# Patient Record
Sex: FEMALE | Race: OTHER | Employment: FULL TIME | URBAN - METROPOLITAN AREA
[De-identification: names, ages, dates, MRNs, and addresses within clinical notes are randomized per-mention and may not be internally consistent; named-entity substitution may affect disease eponyms.]

---

## 2018-06-05 ENCOUNTER — OFFICE VISIT (OUTPATIENT)
Dept: URGENT CARE | Age: 33
End: 2018-06-05

## 2018-06-05 VITALS
BODY MASS INDEX: 28.19 KG/M2 | OXYGEN SATURATION: 99 % | WEIGHT: 186 LBS | HEIGHT: 68 IN | DIASTOLIC BLOOD PRESSURE: 76 MMHG | TEMPERATURE: 98 F | RESPIRATION RATE: 16 BRPM | HEART RATE: 60 BPM | SYSTOLIC BLOOD PRESSURE: 134 MMHG

## 2018-06-05 DIAGNOSIS — H10.9 CONJUNCTIVITIS, UNSPECIFIED CONJUNCTIVITIS TYPE, UNSPECIFIED LATERALITY: Primary | ICD-10-CM

## 2018-06-05 RX ORDER — CIPROFLOXACIN HYDROCHLORIDE 3.5 MG/ML
2 SOLUTION/ DROPS TOPICAL 4 TIMES DAILY
Qty: 5 ML | Refills: 0 | Status: SHIPPED | OUTPATIENT
Start: 2018-06-05 | End: 2018-06-13 | Stop reason: SDUPTHER

## 2018-06-05 RX ORDER — NORETHINDRONE ACETATE AND ETHINYL ESTRADIOL 1; .02 MG/1; MG/1
TABLET ORAL
COMMUNITY
End: 2019-04-25

## 2018-06-05 RX ORDER — POLYMYXIN B SULFATE AND TRIMETHOPRIM 1; 10000 MG/ML; [USP'U]/ML
1 SOLUTION OPHTHALMIC EVERY 6 HOURS
Qty: 10 ML | Refills: 0 | Status: SHIPPED | OUTPATIENT
Start: 2018-06-05 | End: 2018-06-05

## 2018-06-05 NOTE — PATIENT INSTRUCTIONS
Pinkeye: Care Instructions  Your Care Instructions    Pinkeye is redness and swelling of the eye surface and the conjunctiva (the lining of the eyelid and the covering of the white part of the eye). Pinkeye is also called conjunctivitis. Pinkeye is often caused by infection with bacteria or a virus. Dry air, allergies, smoke, and chemicals are other common causes. Pinkeye often clears on its own in 7 to 10 days. Antibiotics only help if the pinkeye is caused by bacteria. Pinkeye caused by infection spreads easily. If an allergy or chemical is causing pinkeye, it will not go away unless you can avoid whatever is causing it. Follow-up care is a key part of your treatment and safety. Be sure to make and go to all appointments, and call your doctor if you are having problems. It's also a good idea to know your test results and keep a list of the medicines you take. How can you care for yourself at home? · Wash your hands often. Always wash them before and after you treat pinkeye or touch your eyes or face. · Use moist cotton or a clean, wet cloth to remove crust. Wipe from the inside corner of the eye to the outside. Use a clean part of the cloth for each wipe. · Put cold or warm wet cloths on your eye a few times a day if the eye hurts. · Do not wear contact lenses or eye makeup until the pinkeye is gone. Throw away any eye makeup you were using when you got pinkeye. Clean your contacts and storage case. If you wear disposable contacts, use a new pair when your eye has cleared and it is safe to wear contacts again. · If the doctor gave you antibiotic ointment or eyedrops, use them as directed. Use the medicine for as long as instructed, even if your eye starts looking better soon. Keep the bottle tip clean, and do not let it touch the eye area. · To put in eyedrops or ointment:  ¨ Tilt your head back, and pull your lower eyelid down with one finger.   ¨ Drop or squirt the medicine inside the lower lid.  ¨ Close your eye for 30 to 60 seconds to let the drops or ointment move around. ¨ Do not touch the ointment or dropper tip to your eyelashes or any other surface. · Do not share towels, pillows, or washcloths while you have pinkeye. When should you call for help? Call your doctor now or seek immediate medical care if:  ? · You have pain in your eye, not just irritation on the surface. ? · You have a change in vision or loss of vision. ? · You have an increase in discharge from the eye.   ? · Your eye has not started to improve or begins to get worse within 48 hours after you start using antibiotics. ? · Pinkeye lasts longer than 7 days. ? Watch closely for changes in your health, and be sure to contact your doctor if you have any problems. Where can you learn more? Go to http://supriya-barrington.info/. Enter Y392 in the search box to learn more about \"Pinkeye: Care Instructions. \"  Current as of: March 20, 2017  Content Version: 11.4  © 4262-4402 Healthwise, Incorporated. Care instructions adapted under license by [employer] (which disclaims liability or warranty for this information). If you have questions about a medical condition or this instruction, always ask your healthcare professional. [address] any warranty or liability for your use of this information.

## 2018-06-05 NOTE — MR AVS SNAPSHOT
[address] [facility] [address] 
[phone] Patient: Michale Mata MRN: [id number] Visit Information Date & Time Provider Department Dept. Phone Encounter #  
 6/5/2018 12:45 PM [address] [facility] [phone] [id number] Upcoming Health Maintenance Date Due DTaP/Tdap/Td series (1 - Tdap) 8/16/2007 PAP AKA CERVICAL CYTOLOGY 8/16/2007 Influenza Age 5 to Adult 8/1/2018 Allergies as of 6/5/2018  Review Complete On: 6/5/2018 By: Jose Sommers No Known Allergies Current Immunizations  Never Reviewed No immunizations on file. Not reviewed this visit You Were Diagnosed With   
  
 Codes Comments Conjunctivitis, unspecified conjunctivitis type, unspecified laterality    -  Primary ICD-10-CM: H10.9 ICD-9-CM: 372.30 Vitals BP Pulse Temp Resp Height(growth percentile) Weight(growth percentile) 134/76 60 98 °F (36.7 °C) 16 5' 8\" (1.727 m) 186 lb (84.4 kg) SpO2 BMI OB Status Smoking Status 99% 28.28 kg/m2 Having regular periods Never Smoker Vitals History BMI and BSA Data Body Mass Index Body Surface Area  
 28.28 kg/m 2 2.01 m 2 Preferred Pharmacy Pharmacy Name Phone [facility] [address] [phone] Your Updated Medication List  
  
   
This list is accurate as of 6/5/18 12:51 PM.  Always use your most recent med list.  
  
  
  
  
 Shanthi Cisse 1/20 (21) 1-20 mg-mcg Tab Generic drug:  norethindrone-ethinyl estradiol Take  by mouth. trimethoprim-polymyxin b ophthalmic solution Commonly known as:  POLYTRIM Administer 1 Drop to left eye every six (6) hours for 7 days. Prescriptions Sent to Pharmacy Refills  
 trimethoprim-polymyxin b (POLYTRIM) ophthalmic solution 0 Sig: Administer 1 Drop to left eye every six (6) hours for 7 days. Class: Normal  
 Pharmacy: [address] [facility], [address] [place] [address].  #: [phone] Route: Left Eye Patient Instructions Pinkeye: Care Instructions Your Care Instructions Pinkeye is redness and swelling of the eye surface and the conjunctiva (the lining of the eyelid and the covering of the white part of the eye). Pinkeye is also called conjunctivitis. Pinkeye is often caused by infection with bacteria or a virus. Dry air, allergies, smoke, and chemicals are other common causes. Pinkeye often clears on its own in 7 to 10 days. Antibiotics only help if the pinkeye is caused by bacteria. Pinkeye caused by infection spreads easily. If an allergy or chemical is causing pinkeye, it will not go away unless you can avoid whatever is causing it. Follow-up care is a key part of your treatment and safety. Be sure to make and go to all appointments, and call your doctor if you are having problems. It's also a good idea to know your test results and keep a list of the medicines you take. How can you care for yourself at home? · Wash your hands often. Always wash them before and after you treat pinkeye or touch your eyes or face. · Use moist cotton or a clean, wet cloth to remove crust. Wipe from the inside corner of the eye to the outside. Use a clean part of the cloth for each wipe. · Put cold or warm wet cloths on your eye a few times a day if the eye hurts. · Do not wear contact lenses or eye makeup until the pinkeye is gone. Throw away any eye makeup you were using when you got pinkeye. Clean your contacts and storage case. If you wear disposable contacts, use a new pair when your eye has cleared and it is safe to wear contacts again. · If the doctor gave you antibiotic ointment or eyedrops, use them as directed. Use the medicine for as long as instructed, even if your eye starts looking better soon. Keep the bottle tip clean, and do not let it touch the eye area. · To put in eyedrops or ointment: ¨ Tilt your head back, and pull your lower eyelid down with one finger. ¨ Drop or squirt the medicine inside the lower lid. ¨ Close your eye for 30 to 60 seconds to let the drops or ointment move around. ¨ Do not touch the ointment or dropper tip to your eyelashes or any other surface. · Do not share towels, pillows, or washcloths while you have pinkeye. When should you call for help? Call your doctor now or seek immediate medical care if: 
? · You have pain in your eye, not just irritation on the surface. ? · You have a change in vision or loss of vision. ? · You have an increase in discharge from the eye.  
? · Your eye has not started to improve or begins to get worse within 48 hours after you start using antibiotics. ? · Pinkeye lasts longer than 7 days. ? Watch closely for changes in your health, and be sure to contact your doctor if you have any problems. Where can you learn more? Go to http://uspriya-barrington.info/. Enter Y392 in the search box to learn more about \"Pinkeye: Care Instructions. \" Current as of: March 20, 2017 Content Version: 11.4 © 8102-5507 Healthwise, Incorporated. Care instructions adapted under license by [employer] (which disclaims liability or warranty for this information). If you have questions about a medical condition or this instruction, always ask your healthcare professional. [place] [address] any warranty or liability for your use of this information. Introducing [facility]! [employer] introduces [employer] patient portal. Now you can access parts of your medical record, email your doctor's office, and request medication refills online. 1. In your internet browser, go to [email or web address] 2. Click on the First Time User? Click Here link in the Sign In box. You will see the New Member Sign Up page. 3. Enter your [employer] Access Code exactly as it appears below. You will not need to use this code after youve completed the sign-up process. If you do not sign up before the expiration date, you must request a new code. · [employer] Access Code: [email or web address] Expires: 9/3/2018 12:29 PM 
 
4. Enter the last four digits of your Social Security Number (xxxx) and Date of Birth (mm/dd/yyyy) as indicated and click Submit. You will be taken to the next sign-up page. 5. Create a [employer] ID. This will be your [employer] login ID and cannot be changed, so think of one that is secure and easy to remember. 6. Create a [employer] password. You can change your password at any time. 7. Enter your Password Reset Question and Answer. This can be used at a later time if you forget your password. 8. Enter your e-mail address. You will receive e-mail notification when new information is available in [address]. 9. Click Sign Up. You can now view and download portions of your medical record. 10. Click the Download Summary menu link to download a portable copy of your medical information. If you have questions, please visit the Frequently Asked Questions section of the [employer] website. Remember, [employer] is NOT to be used for urgent needs. For medical emergencies, dial 911. Now available from your iPhone and Android! Please provide this summary of care documentation to your next provider. Your primary care clinician is listed as Sven CHAVEZ. If you have any questions after today's visit, please call [phone].

## 2018-06-05 NOTE — PROGRESS NOTES
HPI Comments: 33 y.o. female with burning, redness, discharge and mattering in left eye for 2 days. No other symptoms. No significant prior ophthalmological history. No change in visual acuity, no photophobia, no severe eye pain. Son has pink eye on polytrim drops, which she has used with improvement. The history is provided by the patient. History reviewed. No pertinent past medical history. Past Surgical History:   Procedure Laterality Date    HX  SECTION  2017         Family History   Problem Relation Age of Onset    Hypertension Father     Asthma Father         Social History     Social History    Marital status:      Spouse name: N/A    Number of children: N/A    Years of education: N/A     Occupational History    Not on file. Social History Main Topics    Smoking status: Never Smoker    Smokeless tobacco: Never Used    Alcohol use No    Drug use: Not on file    Sexual activity: Not on file     Other Topics Concern    Not on file     Social History Narrative                ALLERGIES: Review of patient's allergies indicates no known allergies. Review of Systems   Eyes: Positive for discharge, redness and itching. Negative for photophobia, pain and visual disturbance. Vitals:    18 1233   BP: 134/76   Pulse: 60   Resp: 16   Temp: 98 °F (36.7 °C)   SpO2: 99%   Weight: 186 lb (84.4 kg)   Height: 5' 8\" (1.727 m)       Physical Exam   Constitutional: She appears well-developed and well-nourished. No distress. Eyes: EOM are normal. Pupils are equal, round, and reactive to light. Right eye exhibits no discharge. Left eye exhibits no discharge. Right conjunctiva is not injected. Left conjunctiva is injected. Neurological: She is alert. Skin: She is not diaphoretic. Psychiatric: She has a normal mood and affect. Her behavior is normal. Judgment and thought content normal.   Nursing note and vitals reviewed.       MDM    Procedures ICD-10-CM ICD-9-CM    1. Conjunctivitis, unspecified conjunctivitis type, unspecified laterality H10.9 372.30      Medications Ordered Today   Medications    DISCONTD: trimethoprim-polymyxin b (POLYTRIM) ophthalmic solution     Sig: Administer 1 Drop to left eye every six (6) hours for 7 days. Dispense:  10 mL     Refill:  0    ciprofloxacin HCl (CILOXAN) 0.3 % ophthalmic solution     Sig: Administer 2 Drops to left eye four (4) times daily for 7 days. Dispense:  5 mL     Refill:  0     The patients condition was discussed with the patient and they understand. The patient is to follow up with PCP [facility]. If signs and symptoms become worse the pt is to go to the ER. The patient is to take medications as prescribed.

## 2018-06-13 RX ORDER — CIPROFLOXACIN HYDROCHLORIDE 3.5 MG/ML
2 SOLUTION/ DROPS TOPICAL 4 TIMES DAILY
Qty: 5 ML | Refills: 0 | Status: SHIPPED | OUTPATIENT
Start: 2018-06-13 | End: 2018-06-20

## 2018-06-13 NOTE — PROGRESS NOTES
Patient called. Spoke with nurse. States ran out of cipro eye drops and right eye now infected. Escribed Cipro drops to pharmacy.

## 2019-04-25 ENCOUNTER — OFFICE VISIT (OUTPATIENT)
Dept: URGENT CARE | Age: 34
End: 2019-04-25

## 2019-04-25 VITALS
DIASTOLIC BLOOD PRESSURE: 94 MMHG | WEIGHT: 185 LBS | OXYGEN SATURATION: 98 % | SYSTOLIC BLOOD PRESSURE: 145 MMHG | TEMPERATURE: 97.2 F | BODY MASS INDEX: 28.04 KG/M2 | HEIGHT: 68 IN | HEART RATE: 77 BPM | RESPIRATION RATE: 18 BRPM

## 2019-04-25 DIAGNOSIS — J02.9 SORE THROAT: Primary | ICD-10-CM

## 2019-04-25 DIAGNOSIS — J30.9 ALLERGIC RHINITIS, UNSPECIFIED SEASONALITY, UNSPECIFIED TRIGGER: ICD-10-CM

## 2019-04-25 LAB
S PYO AG THROAT QL: NEGATIVE
VALID INTERNAL CONTROL?: YES

## 2019-04-25 RX ORDER — LEVOCETIRIZINE DIHYDROCHLORIDE 5 MG/1
5 TABLET, FILM COATED ORAL DAILY
Qty: 30 TAB | Refills: 1 | Status: SHIPPED | OUTPATIENT
Start: 2019-04-25 | End: 2020-04-02 | Stop reason: ALTCHOICE

## 2019-04-25 NOTE — LETTER
NOTIFICATION RETURN TO WORK / SCHOOL 
 
4/25/2019 11:48 AM 
 
Ms. Frank Cuadra [address] [address] [address] To Whom It May Concern: 
 
Frank Cuadra is currently under the care of [address] [place] [address]. She will return to work/school on: 04/26 OR 04/27/2019 If there are questions or concerns please have the patient contact our office. Sincerely, E PROVIDER

## 2019-04-25 NOTE — PROGRESS NOTES
Here concerned about strep  2-3 days of sore throat  Worse with swallowing but tolerating food and fluids without issue  Pain 3/10. No fevers or chills  Associated nasal congestion and sneezing  +possible exposure to strep. 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 Overall not improved with home therapies. No past medical history on file.      Past Surgical History:   Procedure Laterality Date    HX  SECTION  2017         Family History   Problem Relation Age of Onset    Hypertension Father     Asthma Father         Social History     Socioeconomic History    Marital status:      Spouse name: Not on file    Number of children: Not on file    Years of education: Not on file    Highest education level: Not on file   Occupational History    Not on file   Social Needs    Financial resource strain: Not on file    Food insecurity:     Worry: Not on file     Inability: Not on file    Transportation needs:     Medical: Not on file     Non-medical: Not on file   Tobacco Use    Smoking status: Never Smoker    Smokeless tobacco: Never Used   Substance and Sexual Activity    Alcohol use: No    Drug use: Not on file    Sexual activity: Not on file   Lifestyle    Physical activity:     Days per week: Not on file     Minutes per session: Not on file    Stress: Not on file   Relationships    Social connections:     Talks on phone: Not on file     Gets together: Not on file     Attends [employer] service: Not on file     Active member of club or organization: Not on file     Attends meetings of clubs or organizations: Not on file     Relationship status: Not on file    Intimate partner violence:     Fear of current or ex partner: Not on file     Emotionally abused: Not on file     Physically abused: Not on file     Forced sexual activity: Not on file   Other Topics Concern    Not on file   Social History Narrative    Not on file                ALLERGIES: Patient has no known allergies. Review of Systems   Skin: Negative for rash. All other systems reviewed and are negative. Vitals:    04/25/19 1101   BP: (!) 145/94   Pulse: 77   Resp: 18   Temp: 97.2 °F (36.2 °C)   SpO2: 98%   Weight: 185 lb (83.9 kg)   Height: 5' 8\" (1.727 m)       Physical Exam   Constitutional: She is oriented to person, place, and time. No distress. Non-toxic appearing, well hydrated   HENT:   Pale swollen nasal turbinates bilat. TMs normal  OP erythema without swelling, exudate or lesion. Mucus membranes moist. Uvula midline   Eyes: Pupils are equal, round, and reactive to light. EOM are normal. No scleral icterus. Neck: Normal range of motion. Neck supple. Cardiovascular: Normal rate, regular rhythm and normal heart sounds. Exam reveals no gallop and no friction rub. No murmur heard. Pulmonary/Chest: Effort normal and breath sounds normal. No respiratory distress. She has no wheezes. She has no rales. Lymphadenopathy:     She has no cervical adenopathy. Neurological: She is alert and oriented to person, place, and time. Skin: Skin is warm and dry. No rash noted. She is not diaphoretic. No erythema. No pallor. Psychiatric: She has a normal mood and affect. Her behavior is normal. Thought content normal.   Nursing note and vitals reviewed. MDM     Differential Diagnosis; Clinical Impression; Plan:       CLINICAL IMPRESSION:  (J02.9) Sore throat  (primary encounter diagnosis)  (J30.9) Allergic rhinitis, unspecified seasonality, unspecified trigger    Orders Placed This Encounter      levocetirizine (XYZAL) 5 mg tablet          Sig: Take 1 Tab by mouth daily. Dispense:  30 Tab          Refill:  1      Plan:  Negative rapid strep. No indication for culture based on clinical presentation.  Suspect allergies/post nasal drip at this time  Start xyzal  Gargles, lozenges, tylenol for throat discomfort  Review hadnouts    We have reviewed concerning signs/symptoms, normal vs abnormal progression of medical condition and when to seek immediate medical attention. Schedule with PCP or Urgent Care immediately for worsening or new symptoms. See your PCP if there is not at least some improvement in symptoms within the next 5-7 days. You should see your PCP for updates on your routine health maintenance.              Procedures

## 2019-04-25 NOTE — PATIENT INSTRUCTIONS
Follow up with PCP without improvement over next 5-7 days sooner/immediately for new or worsening  Strep test was negative today     Sore Throat: Care Instructions  Your Care Instructions    Infection by bacteria or a virus causes most sore throats. Cigarette smoke, dry air, air pollution, allergies, and yelling can also cause a sore throat. Sore throats can be painful and annoying. Fortunately, most sore throats go away on their own. If you have a bacterial infection, your doctor may prescribe antibiotics. Follow-up care is a key part of your treatment and safety. Be sure to make and go to all appointments, and call your doctor if you are having problems. It's also a good idea to know your test results and keep a list of the medicines you take. How can you care for yourself at home? · If your doctor prescribed antibiotics, take them as directed. Do not stop taking them just because you feel better. You need to take the full course of antibiotics. · Gargle with warm salt water once an hour to help reduce swelling and relieve discomfort. Use 1 teaspoon of salt mixed in 1 cup of warm water. · Take an over-the-counter pain medicine, such as acetaminophen (Tylenol), ibuprofen (Advil, Motrin), or naproxen (Aleve). Read and follow all instructions on the label. · Be careful when taking over-the-counter cold or flu medicines and Tylenol at the same time. Many of these medicines have acetaminophen, which is Tylenol. Read the labels to make sure that you are not taking more than the recommended dose. Too much acetaminophen (Tylenol) can be harmful. · Drink plenty of fluids. Fluids may help soothe an irritated throat. Hot fluids, such as tea or soup, may help decrease throat pain. · Use over-the-counter throat lozenges to soothe pain. Regular cough drops or hard candy may also help. These should not be given to young children because of the risk of choking. · Do not smoke or allow others to smoke around you.  If you need help quitting, talk to your doctor about stop-smoking programs and medicines. These can increase your chances of quitting for good. · Use a vaporizer or humidifier to add moisture to your bedroom. Follow the directions for cleaning the machine. When should you call for help? Call your doctor now or seek immediate medical care if:    · You have new or worse trouble swallowing.     · Your sore throat gets much worse on one side.    Watch closely for changes in your health, and be sure to contact your doctor if you do not get better as expected. Where can you learn more? Go to http://supriya-barrignton.info/. Enter [phone] [id number] [phone] in the search box to learn more about \"Sore Throat: Care Instructions. \"  Current as of: March 27, 2018  Content Version: 11.9  © 1448-7297 [employer]. Care instructions adapted under license by [employer] (which disclaims liability or warranty for this information). If you have questions about a medical condition or this instruction, always ask your healthcare professional. [address] any warranty or liability for your use of this information.

## 2019-05-02 ENCOUNTER — OFFICE VISIT (OUTPATIENT)
Dept: PRIMARY CARE CLINIC | Age: 34
End: 2019-05-02

## 2019-05-02 VITALS
WEIGHT: 185.8 LBS | SYSTOLIC BLOOD PRESSURE: 126 MMHG | BODY MASS INDEX: 28.16 KG/M2 | TEMPERATURE: 98.1 F | DIASTOLIC BLOOD PRESSURE: 75 MMHG | OXYGEN SATURATION: 98 % | HEART RATE: 51 BPM | RESPIRATION RATE: 18 BRPM | HEIGHT: 68 IN

## 2019-05-02 DIAGNOSIS — R05.9 COUGH: ICD-10-CM

## 2019-05-02 DIAGNOSIS — J30.1 SEASONAL ALLERGIC RHINITIS DUE TO POLLEN: Primary | ICD-10-CM

## 2019-05-02 RX ORDER — MONTELUKAST SODIUM 10 MG/1
10 TABLET ORAL DAILY
Qty: 30 TAB | Refills: 0 | Status: SHIPPED | OUTPATIENT
Start: 2019-05-02 | End: 2020-04-02 | Stop reason: ALTCHOICE

## 2019-05-02 NOTE — PROGRESS NOTES
Subjective:  
Marce Zee is a 29 y.o. female who complains of sore throat (worse at night and after coughing), congestion, dry cough and bilateral ear fullness for 9 days, stable since that time. Seen at [facility]  for sore throat and allergies. Neg RST. Still not feeling any better. Coughing throughout the day, feels like throat is swollen at night due to cough. Denies any fevers, nasal discharge or ear pain. Has headaches due to cough. Taking advil cold/sinus and robitussin. She denies a history of shortness of breath and wheezing. Patient does not smoke cigarettes. Relevant PMH: History reviewed. No pertinent past medical history. Past Surgical History:  
Procedure Laterality Date  HX  SECTION  2017 No Known Allergies Review of Systems Pertinent items are noted in HPI. Objective:  
 
Visit Vitals /75 (BP 1 Location: Left arm, BP Patient Position: Sitting) Pulse (!) 51 Temp 98.1 °F (36.7 °C) (Oral) Resp 18 Ht 5' 8\" (1.727 m) Wt 185 lb 12.8 oz (84.3 kg) SpO2 98% BMI 28.25 kg/m² General:  alert, cooperative, no distress Eyes: negative Ears: TM's with mild fluid bilaterally, no erythema Sinuses: Normal paranasal sinuses without tenderness Mouth:  Lips, mucosa, and tongue normal. Teeth and gums normal and normal findings: oropharynx pink & moist without lesions or evidence of thrush Neck: supple, symmetrical, trachea midline and no adenopathy. Heart: S1 and S2 normal, no murmurs noted. Lungs: clear to auscultation bilaterally Assessment/Plan: ICD-10-CM ICD-9-CM 1. Seasonal allergic rhinitis due to pollen J30.1 477.0 2. Cough R05 786.2 Orders Placed This Encounter  montelukast (SINGULAIR) 10 mg tablet Start daily antihistamine. Suggested symptomatic OTC remedies. RTC prn. Cherrie Vanessa, WALDEMAR This note will not be viewable in [address]

## 2019-05-02 NOTE — PROGRESS NOTES
Chief Complaint Patient presents with  Cold Symptoms  
pt c/o continued sore throat and sinus pressure since last weekend, pt states she has taken otc tylenol cold and sinus This note will not be viewable in [address]

## 2019-05-02 NOTE — PATIENT INSTRUCTIONS
Seasonal Allergies: Care Instructions Your Care Instructions Allergies occur when your body's defense system (immune system) overreacts to certain substances. The immune system treats a harmless substance as if it were a harmful germ or virus. Many things can cause this to happen. Examples include pollens, medicine, food, dust, animal dander, and mold. Your allergies are seasonal if you have symptoms just at certain times of the year. In that case, you are probably allergic to pollens from certain trees, grasses, or weeds. Allergies can be mild or severe. Over-the-counter allergy medicine may help with some symptoms. Read and follow all instructions on the label. Managing your allergies is an important part of staying healthy. Your doctor may suggest that you have tests to help find the cause of your allergies. When you know what things trigger your symptoms, you can avoid them. This can prevent allergy symptoms and other health problems. In some cases, immunotherapy might help. For this treatment, you get shots or use pills that have a small amount of certain allergens in them. Your body \"gets used to\" the allergen, so you react less to it over time. This kind of treatment may help prevent or reduce some allergy symptoms. Follow-up care is a key part of your treatment and safety. Be sure to make and go to all appointments, and call your doctor if you are having problems. It's also a good idea to know your test results and keep a list of the medicines you take. How can you care for yourself at home? · Be safe with medicines. Take your medicines exactly as prescribed. Call your doctor if you think you are having a problem with your medicine. · During your allergy season, keep windows closed. If you need to use air-conditioning, change or clean all filters every month. Take a shower and change your clothes after you have been outside. · Stay inside when pollen counts are high. Vacuum once or twice a week. Use a vacuum  with a HEPA filter or a double-thickness filter. When should you call for help? Give an epinephrine shot if: 
  · You think you are having a severe allergic reaction.  
 After giving an epinephrine shot, call 911, even if you feel better. 
 Call 911 if: 
  · You have symptoms of a severe allergic reaction. These may include: 
? Sudden raised, red areas (hives) all over your body. ? Swelling of the throat, mouth, lips, or tongue. ? Trouble breathing. ? Passing out (losing consciousness). Or you may feel very lightheaded or suddenly feel weak, confused, or restless.  
  · You have been given an epinephrine shot, even if you feel better.  
 Call your doctor now or seek immediate medical care if: 
  · You have symptoms of an allergic reaction, such as: ? A rash or hives (raised, red areas on the skin). ? Itching. ? Swelling. ? Belly pain, nausea, or vomiting.  
 Watch closely for changes in your health, and be sure to contact your doctor if: 
  · You do not get better as expected. Where can you learn more? Go to http://supriya-barrington.info/. Enter J912 in the search box to learn more about \"Seasonal Allergies: Care Instructions. \" Current as of: June 27, 2018 Content Version: 11.9 © 5392-2930 [employer]. Care instructions adapted under license by [employer] (which disclaims liability or warranty for this information). If you have questions about a medical condition or this instruction, always ask your healthcare professional. [place] [address] any warranty or liability for your use of this information.

## 2020-02-21 ENCOUNTER — TELEPHONE (OUTPATIENT)
Dept: INTERNAL MEDICINE CLINIC | Age: 35
End: 2020-02-21

## 2020-02-21 NOTE — TELEPHONE ENCOUNTER
Patient last seen 2/12/2015 is requesting a call back to get a sooner than available appt to [facility] Complete Physical appt. Please call to discuss.  Thank you

## 2020-02-24 NOTE — TELEPHONE ENCOUNTER
Called, spoke to pt. Two identifiers confirmed. Appointment scheduled for 4/2 @ 130 with Dr. Jason Lomeli. Pt verbalized understanding of information discussed w/ no further questions at this time.

## 2020-04-01 NOTE — PROGRESS NOTES
HISTORY OF PRESENT ILLNESS  Mckayla Barnard is a 36 y.o. female. HPI     Pursuant to the emergency declaration under the [address] and [employer], 1135 waiver authority and the [employer] and [employer] Act, this Virtual Visit was conducted, with patient's consent, to reduce the patient's risk of exposure to COVID-19 and provide continuity of care for an established patient. Services were provided through a video synchronous discussion virtually to substitute for in-person appointment. Pt here to re-establish care and CPE  Last OV 2015    Hx migraine headaches-not active    Gyn MD--had OV at [facility] this year for PAP and well woman exam  BP wnl at gyn appt  Has 2 1/3 yo son now--lives in [place] with  but will be staying in [facility] for the next few months  Gained 65 lbs with last pregnancy but has been able to lose 55 lbs with low carb diet and daily exercise    Only c/o dry patch on upper lip after she drinks red wine , similar to patch of eczema, lasts 2 days  No hives or angioedema      NS rare etoh     1 son    Patient Active Problem List   Diagnosis Code    Migraine G43.909     Current Outpatient Medications   Medication Sig Dispense Refill    norethindrone-ethin. estradiol (NORETHIN-ETH ESTRAD BIPHASIC PO) Take  by mouth.  OTHER CBD  Oil use daily       No Known Allergies  Social History     Tobacco Use    Smoking status: Never Smoker    Smokeless tobacco: Never Used   Substance Use Topics    Alcohol use: No           Review of Systems   Constitutional: Negative for chills, fever, malaise/fatigue and weight loss. Eyes: Negative for blurred vision and double vision. Respiratory: Negative for cough and shortness of breath. Cardiovascular: Negative for chest pain and palpitations. Gastrointestinal: Negative for abdominal pain, blood in stool, constipation, diarrhea, melena, nausea and vomiting.    Genitourinary: Negative for dysuria, frequency, hematuria and urgency. Musculoskeletal: Negative for back pain, falls, joint pain and myalgias. Neurological: Negative for dizziness, tremors and headaches. Physical Exam  Constitutional:       Appearance: Normal appearance. HENT:      Head: Normocephalic. Pulmonary:      Effort: Pulmonary effort is normal.   Neurological:      Mental Status: She is alert and oriented to person, place, and time. ASSESSMENT and PLAN  Diagnoses and all orders for this visit:    1. Routine general medical examination at a health care facility  -     CBC W/O DIFF  -     METABOLIC PANEL, COMPREHENSIVE  -     LIPID PANEL  -     TSH 3RD GENERATION   Continue healthy lifestyle   Goal weight < 170 lbs -discussed      2. Dermatitis   ? Reaction to red wine vs eczema   Will avoid wine       3.  Joint pain   Improved with CBD oil   No s/s of synovitis

## 2020-04-02 ENCOUNTER — VIRTUAL VISIT (OUTPATIENT)
Dept: INTERNAL MEDICINE CLINIC | Age: 35
End: 2020-04-02

## 2020-04-02 DIAGNOSIS — Z00.00 ROUTINE GENERAL MEDICAL EXAMINATION AT A HEALTH CARE FACILITY: Primary | ICD-10-CM

## 2020-09-03 LAB
ALBUMIN SERPL-MCNC: 4.8 G/DL (ref 3.8–4.8)
ALBUMIN/GLOB SERPL: 1.7 {RATIO} (ref 1.2–2.2)
ALP SERPL-CCNC: 58 IU/L (ref 39–117)
ALT SERPL-CCNC: 8 IU/L (ref 0–32)
AST SERPL-CCNC: 17 IU/L (ref 0–40)
BILIRUB SERPL-MCNC: 0.6 MG/DL (ref 0–1.2)
BUN SERPL-MCNC: 17 MG/DL (ref 6–20)
BUN/CREAT SERPL: 17 (ref 9–23)
CALCIUM SERPL-MCNC: 9.6 MG/DL (ref 8.7–10.2)
CHLORIDE SERPL-SCNC: 100 MMOL/L (ref 96–106)
CHOLEST SERPL-MCNC: 200 MG/DL (ref 100–199)
CO2 SERPL-SCNC: 25 MMOL/L (ref 20–29)
CREAT SERPL-MCNC: 1 MG/DL (ref 0.57–1)
ERYTHROCYTE [DISTWIDTH] IN BLOOD BY AUTOMATED COUNT: 12.4 % (ref 11.7–15.4)
GLOBULIN SER CALC-MCNC: 2.8 G/DL (ref 1.5–4.5)
GLUCOSE SERPL-MCNC: 81 MG/DL (ref 65–99)
HCT VFR BLD AUTO: 43.5 % (ref 34–46.6)
HDLC SERPL-MCNC: 73 MG/DL
HGB BLD-MCNC: 14.3 G/DL (ref 11.1–15.9)
LDLC SERPL CALC-MCNC: 108 MG/DL (ref 0–99)
MCH RBC QN AUTO: 30 PG (ref 26.6–33)
MCHC RBC AUTO-ENTMCNC: 32.9 G/DL (ref 31.5–35.7)
MCV RBC AUTO: 91 FL (ref 79–97)
PLATELET # BLD AUTO: 236 X10E3/UL (ref 150–450)
POTASSIUM SERPL-SCNC: 4.9 MMOL/L (ref 3.5–5.2)
PROT SERPL-MCNC: 7.6 G/DL (ref 6–8.5)
RBC # BLD AUTO: 4.77 X10E6/UL (ref 3.77–5.28)
SODIUM SERPL-SCNC: 139 MMOL/L (ref 134–144)
TRIGL SERPL-MCNC: 107 MG/DL (ref 0–149)
TSH SERPL DL<=0.005 MIU/L-ACNC: 1 UIU/ML (ref 0.45–4.5)
VLDLC SERPL CALC-MCNC: 19 MG/DL (ref 5–40)
WBC # BLD AUTO: 4 X10E3/UL (ref 3.4–10.8)

## 2021-03-11 ENCOUNTER — VIRTUAL VISIT (OUTPATIENT)
Dept: INTERNAL MEDICINE CLINIC | Age: 36
End: 2021-03-11

## 2021-03-11 DIAGNOSIS — R59.0 LYMPHADENOPATHY, AXILLARY: Primary | ICD-10-CM

## 2021-03-11 DIAGNOSIS — G43.809 OTHER MIGRAINE WITHOUT STATUS MIGRAINOSUS, NOT INTRACTABLE: ICD-10-CM

## 2021-03-11 PROCEDURE — 99213 OFFICE O/P EST LOW 20 MIN: CPT | Performed by: FAMILY MEDICINE

## 2021-03-11 RX ORDER — SUMATRIPTAN 50 MG/1
TABLET, FILM COATED ORAL
Qty: 9 TAB | Refills: 2 | Status: SHIPPED | OUTPATIENT
Start: 2021-03-11

## 2021-03-11 NOTE — PROGRESS NOTES
Saman Brar is a 30 y.o. female patient of Dr Stanislaw Fernández who presents swelling in right axilla. Onset 6 days ago. Had COVID shot #1, right arm, Moderna shot. Last week. Had headache before and after the shot. Soreness at injection site. Reports migraines. Taking excedrin migraine, some relief. 6-7/10 intensity. Will have nausea and dizziness. Prior ocular migraine. This is an established visit conducted via telemedicine with video. The patient has been instructed that this meets HIPAA criteria and acknowledges and agrees to this method of visitation. Pursuant to the emergency declaration under the [facility], 1135 waiver authority and the [employer] and [employer] Act, this Virtual Visit was conducted, with patient's consent, to reduce the patient's risk of exposure to COVID-19 and provide continuity of care for an established patient. Services were provided through a video synchronous discussion virtually to substitute for in-person clinic visit. No past medical history on file.     Family History   Problem Relation Age of Onset    Hypertension Father     Asthma Father        Social History     Socioeconomic History    Marital status:      Spouse name: Not on file    Number of children: Not on file    Years of education: Not on file    Highest education level: Not on file   Occupational History    Not on file   Social Needs    Financial resource strain: Not on file    Food insecurity     Worry: Not on file     Inability: Not on file    Transportation needs     Medical: Not on file     Non-medical: Not on file   Tobacco Use    Smoking status: Never Smoker    Smokeless tobacco: Never Used   Substance and Sexual Activity    Alcohol use: No    Drug use: Not on file     Comment: CBD oil    Sexual activity: Yes     Partners: Male     Birth control/protection: Pill   Lifestyle    Physical activity     Days per week: Not on file     Minutes per session: Not on file    Stress: Not on file   Relationships    Social connections     Talks on phone: Not on file     Gets together: Not on file     Attends Zoroastrian service: Not on file     Active member of club or organization: Not on file     Attends meetings of clubs or organizations: Not on file     Relationship status: Not on file    Intimate partner violence     Fear of current or ex partner: Not on file     Emotionally abused: Not on file     Physically abused: Not on file     Forced sexual activity: Not on file   Other Topics Concern    Not on file   Social History Narrative    Not on file       Current Outpatient Medications on File Prior to Visit   Medication Sig Dispense Refill    norethindrone-ethin. estradiol (NORETHIN-ETH ESTRAD BIPHASIC PO) Take  by mouth.  OTHER CBD  Oil use daily       No current facility-administered medications on file prior to visit. Review of Systems  Pertinent items are noted in HPI. Objective:     Gen: well appearing female  HEENT: normal conjunctiva, no audible congestion, patient does not see oral erythema, has MMM  Neck: patient does not feel enlarged or tender LAD or masses  Resp: normal respiratory effort, no audible wheezing. CV: patient does not feel palpitations or heart irregularity  Abd: patient does not feel abdominal tenderness or mass, patient does not notice distension  Extrem: patient does not see swelling in ankles or joints. Patient notes tender swelling in right axilla  Neuro: Alert and oriented, able to answer questions without difficulty, able to move all extremities and walk normally        Assessment/Plan:       ICD-10-CM ICD-9-CM    1. Lymphadenopathy, axillary  R59.0 785.6    2. Other migraine without status migrainosus, not intractable  G43.809 346.80 SUMAtriptan (IMITREX) 50 mg tablet   Treat reactive lymphadenopathy with NSAIDs as needed    Track headache triggers. Trial of Imitrex. This was a telemedicine visit with video. Ector Almendarez MD    Follow-up and Dispositions    · Return for annual with Dr Monique Peck in September. Katrin Ibarra

## 2021-09-16 ENCOUNTER — OFFICE VISIT (OUTPATIENT)
Dept: URGENT CARE | Age: 36
End: 2021-09-16
Payer: COMMERCIAL

## 2021-09-16 VITALS — OXYGEN SATURATION: 98 % | HEART RATE: 69 BPM | RESPIRATION RATE: 18 BRPM | TEMPERATURE: 98.8 F

## 2021-09-16 DIAGNOSIS — Z11.59 SCREENING FOR VIRAL DISEASE: Primary | ICD-10-CM

## 2021-09-16 DIAGNOSIS — R09.89 RUNNY NOSE: ICD-10-CM

## 2021-09-16 DIAGNOSIS — J02.9 SORE THROAT: ICD-10-CM

## 2021-09-16 LAB — SARS-COV-2 POC: NEGATIVE

## 2021-09-16 PROCEDURE — 99213 OFFICE O/P EST LOW 20 MIN: CPT | Performed by: FAMILY MEDICINE

## 2021-09-16 PROCEDURE — 87426 SARSCOV CORONAVIRUS AG IA: CPT | Performed by: FAMILY MEDICINE

## 2021-09-16 NOTE — PROGRESS NOTES
This patient was seen at [address] [place] [address] [facility]gent Care while in their vehicle due to COVID-19 pandemic with PPE and focused examination in order to decrease community viral transmission. The patient/guardian gave verbal consent to treat. Beryl Green is a 29 y.o. female who presents with runny nose and nasal congestion since yesterday and ST since this AM. No known COVID contacts. Denies cough, fever, SOB, N/V/D. Took Zyrtec-D yesterday. The history is provided by the patient. History reviewed. No pertinent past medical history. Past Surgical History:   Procedure Laterality Date    HX  SECTION  2017         Family History   Problem Relation Age of Onset    Hypertension Father     Asthma Father         Social History     Socioeconomic History    Marital status:      Spouse name: Not on file    Number of children: Not on file    Years of education: Not on file    Highest education level: Not on file   Occupational History    Not on file   Tobacco Use    Smoking status: Never Smoker    Smokeless tobacco: Never Used   Substance and Sexual Activity    Alcohol use: No    Drug use: Not on file     Comment: CBD oil    Sexual activity: Yes     Partners: Male     Birth control/protection: Pill   Other Topics Concern    Not on file   Social History Narrative    Not on file     Social Determinants of Health     Financial Resource Strain:     Difficulty of Paying Living Expenses:    Food Insecurity:     Worried About Running Out of Food in the Last Year:     [address] in the Last Year:    Transportation Needs:     Lack of Transportation (Medical):      Lack of Transportation (Non-Medical):    Physical Activity:     Days of Exercise per Week:     Minutes of Exercise per Session:    Stress:     Feeling of Stress :    Social Connections:     Frequency of Communication with Friends and Family:     Frequency of Social Gatherings with Friends and Family:  Attends [employer] Services:     Active Member of Clubs or Organizations:     Attends Club or Organization Meetings:     Marital Status:    Intimate Partner Violence:     Fear of Current or Ex-Partner:     Emotionally Abused:     Physically Abused:     Sexually Abused: ALLERGIES: Patient has no known allergies. Review of Systems   Constitutional: Negative for activity change, appetite change and fever. HENT: Positive for congestion, rhinorrhea and sore throat. Respiratory: Negative for cough and shortness of breath. Gastrointestinal: Negative for diarrhea, nausea and vomiting. Vitals:    09/16/21 0822   Pulse: 69   Resp: 18   Temp: 98.8 °F (37.1 °C)   SpO2: 98%       Physical Exam  Vitals and nursing note reviewed. Constitutional:       General: She is not in acute distress. Appearance: She is well-developed. She is not diaphoretic. HENT:      Mouth/Throat:      Mouth: Mucous membranes are moist.      Pharynx: Oropharynx is clear. No oropharyngeal exudate or posterior oropharyngeal erythema. Pulmonary:      Effort: Pulmonary effort is normal. No respiratory distress. Breath sounds: Normal breath sounds. No stridor. No wheezing, rhonchi or rales. Lymphadenopathy:      Cervical: No cervical adenopathy. Neurological:      Mental Status: She is alert. Psychiatric:         Behavior: Behavior normal.         Thought Content: Thought content normal.         Judgment: Judgment normal.         MDM    ICD-10-CM ICD-9-CM   1. Screening for viral disease  Z11.59 V73.99   2. Runny nose  R09.89 784.99   3.  Sore throat  J02.9 462       Orders Placed This Encounter    NOVEL CORONAVIRUS (COVID-19)     Scheduling Instructions:      1) Due to current limited availability of the COVID-19 PCR test, tests will be prioritized and may not be completed.              2) Order only if the test result will change clinical management or necessary for a return to mission-critical employment decision.              3) Print and instruct patient to adhere to CDC home isolation program. (Link Above)              4) Set up or refer patient for a monitoring program.              5) Have patient sign up for and leverage MyChart (if not previously done). Order Specific Question:   Is this test for diagnosis or screening? Answer:   Diagnosis of ill patient     Order Specific Question:   Symptomatic for COVID-19 as defined by CDC? Answer:   Yes     Order Specific Question:   Date of Symptom Onset     Answer:   9/15/2021     Order Specific Question:   Hospitalized for COVID-19? Answer:   No     Order Specific Question:   Admitted to ICU for COVID-19? Answer:   No     Order Specific Question:   Employed in healthcare setting? Answer:   Unknown     Order Specific Question:   Resident in a congregate (group) care setting? Answer:   No     Order Specific Question:   Pregnant? Answer:   No     Order Specific Question:   Previously tested for COVID-19? Answer:   Unknown    AMB POC SARS-COV-2 ANTIGEN     Order Specific Question:   Is this test for diagnosis or screening? Answer:   Diagnosis of ill patient     Order Specific Question:   Symptomatic for COVID-19 as defined by CDC? Answer:   Yes     Order Specific Question:   Date of Symptom Onset     Answer:   9/15/2021     Order Specific Question:   Hospitalized for COVID-19? Answer:   No     Order Specific Question:   Admitted to ICU for COVID-19? Answer:   No     Order Specific Question:   Employed in healthcare setting? Answer:   Unknown     Order Specific Question:   Resident in a congregate (group) care setting? Answer:   No     Order Specific Question:   Pregnant? Answer:   No     Order Specific Question:   Previously tested for COVID-19?      Answer:   Unknown        Quarantine, await PCR results  Mucinex, Tylenol prn    MyChart: active  Provider will call if PCR COVID-19 test is positive If signs and symptoms become worse the pt is to go to the ER.        Results for orders placed or performed in visit on 09/16/21   AMB POC SARS-COV-2   Result Value Ref Range    SARS-COV-2 POC Negative Negative       Procedures

## 2021-09-18 LAB — SARS-COV-2, NAA: NOT DETECTED

## 2021-11-23 ENCOUNTER — OFFICE VISIT (OUTPATIENT)
Dept: URGENT CARE | Age: 36
End: 2021-11-23
Payer: COMMERCIAL

## 2021-11-23 VITALS
TEMPERATURE: 98 F | OXYGEN SATURATION: 98 % | HEIGHT: 68 IN | SYSTOLIC BLOOD PRESSURE: 131 MMHG | WEIGHT: 190 LBS | DIASTOLIC BLOOD PRESSURE: 95 MMHG | HEART RATE: 91 BPM | RESPIRATION RATE: 16 BRPM | BODY MASS INDEX: 28.79 KG/M2

## 2021-11-23 DIAGNOSIS — R00.2 PALPITATION: ICD-10-CM

## 2021-11-23 DIAGNOSIS — R00.0 TACHYCARDIA: Primary | ICD-10-CM

## 2021-11-23 PROCEDURE — 99214 OFFICE O/P EST MOD 30 MIN: CPT | Performed by: FAMILY MEDICINE

## 2021-11-23 RX ORDER — LEVONORGESTREL 52 MG/1
1 INTRAUTERINE DEVICE INTRAUTERINE ONCE
COMMUNITY

## 2021-11-23 NOTE — PATIENT INSTRUCTIONS
Palpitations: Care Instructions  Your Care Instructions     Heart palpitations are the uncomfortable sensation that your heart is beating fast or irregularly. You might feel pounding or fluttering in your chest. It might feel like your heart is skipping a beat. Although palpitations may be caused by a heart problem, they also occur because of stress, fatigue, or use of alcohol, caffeine, or nicotine. Many medicines, including diet pills, antihistamines, decongestants, and some herbal products, can cause heart palpitations. Nearly everyone has palpitations from time to time. Depending on your symptoms, your doctor may need to do more tests to try to find the cause of your palpitations. Follow-up care is a key part of your treatment and safety. Be sure to make and go to all appointments, and call your doctor if you are having problems. It's also a good idea to know your test results and keep a list of the medicines you take. How can you care for yourself at home? · Avoid caffeine, nicotine, and excess alcohol. · Do not take illegal drugs, such as methamphetamines and cocaine. · Do not take weight loss or diet medicines unless you talk with your doctor first.  · Get plenty of sleep. · Do not overeat. · If you have palpitations again, take deep breaths and try to relax. This may slow a racing heart. · If you start to feel lightheaded, lie down to avoid injuries that might result if you pass out and fall down. · Keep a record of your palpitations and bring it to your next doctor's appointment. Write down:  ? The date and time. ? Your pulse. (If your heart is beating fast, it may be hard to count your pulse.)  ? What you were doing when the palpitations started. ? How long the palpitations lasted. ? Any other symptoms. · If an activity causes palpitations, slow down or stop. Talk to your doctor before you do that activity again. · Take your medicines exactly as prescribed.  Call your doctor if you think you are having a problem with your medicine. When should you call for help? Call 911 anytime you think you may need emergency care. For example, call if:    · You passed out (lost consciousness).     · You have symptoms of a heart attack. These may include:  ? Chest pain or pressure, or a strange feeling in the chest.  ? Sweating. ? Shortness of breath. ? Pain, pressure, or a strange feeling in the back, neck, jaw, or upper belly or in one or both shoulders or arms. ? Lightheadedness or sudden weakness. ? A fast or irregular heartbeat. After you call 911, the  may tell you to chew 1 adult-strength or 2 to 4 low-dose aspirin. Wait for an ambulance. Do not try to drive yourself.     · You have symptoms of a stroke. These may include:  ? Sudden numbness, tingling, weakness, or loss of movement in your face, arm, or leg, especially on only one side of your body. ? Sudden vision changes. ? Sudden trouble speaking. ? Sudden confusion or trouble understanding simple statements. ? Sudden problems with walking or balance. ? A sudden, severe headache that is different from past headaches. Call your doctor now or seek immediate medical care if:    · You have heart palpitations and:  ? Are dizzy or lightheaded, or you feel like you may faint. ? Have new or increased shortness of breath. Watch closely for changes in your health, and be sure to contact your doctor if:    · You continue to have heart palpitations. Where can you learn more? Go to http://www.gray.com/  Enter R508 in the search box to learn more about \"Palpitations: Care Instructions. \"  Current as of: April 29, 2021               Content Version: 13.0  © 6834-4027 [employer]. Care instructions adapted under license by [employer] (which disclaims liability or warranty for this information).  If you have questions about a medical condition or this instruction, always ask your healthcare professional. [address] any warranty or liability for your use of this information. Supraventricular Tachycardia: Care Instructions  Overview     Having supraventricular tachycardia (SVT) means that sometimes your heart beats abnormally fast. This fast rhythm is caused by changes in the electrical system of your heart. You may feel a fluttering in your chest (palpitations) and have a fast pulse. When your heart is beating fast, you may feel anxious and lightheaded, be short of breath, and feel discomfort in the chest.  Your doctor may prescribe medicines to help slow down your heartbeat. Your doctor may also suggest you try vagal maneuvers to help slow your heart rate. Your doctor can show you how to do them. In some cases, either cardioversion treatment or a procedure called catheter ablation is done to correct SVT. Your doctor may ask you to wear a small electronic device for 1 or 2 days to monitor your heart. It is called a Holter monitor. Follow-up care is a key part of your treatment and safety. Be sure to make and go to all appointments, and call your doctor if you are having problems. It's also a good idea to know your test results and keep a list of the medicines you take. How can you care for yourself at home? · Be safe with medicines. Take your medicines exactly as prescribed. Call your doctor if you think you are having a problem with your medicine. You will get more details on the specific medicines your doctor prescribes. · If your doctor showed you how to do vagal maneuvers, try them when you have an episode. These maneuvers include bearing down or putting an ice-cold, wet towel on your face. · Monitor your condition by keeping a diary of your SVT episodes. Bring this to your doctor appointments. ? Write down how fast or slow your heart was beating.  To count your heart rate:  § Gently place 2 fingers of your hand on the inside of your other wrist, below your thumb. § Count the beats for 30 seconds. § Then, double the result to get the number of beats per minute. ? Write down if your heart rhythm was regular or irregular. ? Write down the symptoms you had.  ? Write down the time of day your symptoms occurred. ? Write down how long your symptoms lasted. ? Write down what you were doing when your symptoms started. ? Write down what may have helped your symptoms go away. · If they trigger episodes, limit or avoid alcohol or drinks with caffeine. · Do not use over-the-counter decongestants, herbal remedies, diet pills, or \"pep\" pills, which often contain stimulants. · Do not use illegal drugs, such as cocaine, ecstasy, or methamphetamine, which can speed up your heart's rhythm. · Do not smoke. Smoking can make this condition worse. If you need help quitting, talk to your doctor about stop-smoking programs and medicines. These can increase your chances of quitting for good. · Be alert for new or worsening symptoms, such as shortness of breath, pounding of your heart, or unusual tiredness. If new symptoms develop or your symptoms become worse, call your doctor. When should you call for help? Call 911 anytime you think you may need emergency care. For example, call if:    · You passed out (lost consciousness).     · You are short of breath. Call your doctor now or seek immediate medical care if:    · You have a fast heartbeat.     · You are dizzy or lightheaded, or feel like you may faint. Watch closely for changes in your health, and be sure to contact your doctor if:    · You do not get better as expected. Where can you learn more? Go to http://www.gray.com/  Enter G244 in the search box to learn more about \"Supraventricular Tachycardia: Care Instructions. \"  Current as of: April 29, 2021               Content Version: 13.0  © 8923-6526 [employer].    Care instructions adapted under license by [employer] [facility] (which disclaims liability or warranty for this information). If you have questions about a medical condition or this instruction, always ask your healthcare professional. [address] any warranty or liability for your use of this information.

## 2021-11-23 NOTE — PROGRESS NOTES
Palpitations   The history is provided by the patient. This is a new problem. The current episode started 1 to 2 hours ago. The problem has been rapidly improving. On average, each episode lasts 50 minutes. The problem is associated with nothing. Associated symptoms include chest pressure and irregular heartbeat (hR increaed to 160-170  and then reached to 200+). Pertinent negatives include no diaphoresis, no fever, no malaise/fatigue, no numbness, no chest pain (felt chest pressure during episode), no exertional chest pressure, no near-syncope, no orthopnea, no syncope, no abdominal pain, no headaches, no leg pain, no dizziness, no weakness, no cough and no shortness of breath. Risk factors include no risk factors. She has tried nothing for the symptoms. Her past medical history does not include hyperthyroidism, valve disorder, anemia, heart disease, DM, hypertension or congenital heart disease. History reviewed. No pertinent past medical history.      Past Surgical History:   Procedure Laterality Date    HX  SECTION  2017         Family History   Problem Relation Age of Onset    Hypertension Father     Asthma Father         Social History     Socioeconomic History    Marital status:      Spouse name: Not on file    Number of children: Not on file    Years of education: Not on file    Highest education level: Not on file   Occupational History    Not on file   Tobacco Use    Smoking status: Never Smoker    Smokeless tobacco: Never Used   Substance and Sexual Activity    Alcohol use: No    Drug use: Not on file     Comment: CBD oil    Sexual activity: Yes     Partners: Male     Birth control/protection: Pill   Other Topics Concern    Not on file   Social History Narrative    Not on file     Social Determinants of Health     Financial Resource Strain:     Difficulty of Paying Living Expenses: Not on file   Food Insecurity:     Worried About [address] in the Last Year: Not on file    Ran Out of Food in the Last Year: Not on file   Transportation Needs:     Lack of Transportation (Medical): Not on file    Lack of Transportation (Non-Medical): Not on file   Physical Activity:     Days of Exercise per Week: Not on file    Minutes of Exercise per Session: Not on file   Stress:     Feeling of Stress : Not on file   Social Connections:     Frequency of Communication with Friends and Family: Not on file    Frequency of Social Gatherings with Friends and Family: Not on file    Attends Judaism Services: Not on file    Active Member of [address] [place] [address] [employer] or Organizations: Not on file    Attends Club or Organization Meetings: Not on file    Marital Status: Not on file   Intimate Partner Violence:     Fear of Current or Ex-Partner: Not on file    Emotionally Abused: Not on file    Physically Abused: Not on file    Sexually Abused: Not on file   Housing Stability:     Unable to Pay for Housing in the Last Year: Not on file    Number of [place] in the Last Year: Not on file    Unstable Housing in the Last Year: Not on file                ALLERGIES: Patient has no known allergies. Review of Systems   Constitutional: Negative for diaphoresis, fever and malaise/fatigue. Respiratory: Negative for cough and shortness of breath. Cardiovascular: Positive for palpitations. Negative for chest pain (felt chest pressure during episode), orthopnea, syncope and near-syncope. Gastrointestinal: Negative for abdominal pain. Neurological: Negative for dizziness, weakness, numbness and headaches. All other systems reviewed and are negative. Vitals:    11/23/21 1043   BP: (!) 131/95   Pulse: 91   Resp: 16   Temp: 98 °F (36.7 °C)   SpO2: 98%   Weight: 190 lb (86.2 kg)   Height: 5' 8\" (1.727 m)       Physical Exam  Vitals and nursing note reviewed. Constitutional:       Appearance: She is well-developed. HENT:      Head: Normocephalic and atraumatic.       Right Ear: External ear normal.      Left Ear: External ear normal.      Mouth/Throat:      Pharynx: No oropharyngeal exudate. Eyes:      General: No scleral icterus. Right eye: No discharge. Left eye: No discharge. Conjunctiva/sclera: Conjunctivae normal.      Pupils: Pupils are equal, round, and reactive to light. Neck:      Thyroid: No thyromegaly. Trachea: No tracheal deviation. Cardiovascular:      Rate and Rhythm: Regular rhythm. Tachycardia present. No extrasystoles are present. Pulses: Normal pulses. Heart sounds: Normal heart sounds. No murmur heard. Pulmonary:      Effort: Pulmonary effort is normal. No respiratory distress. Breath sounds: Normal breath sounds. No wheezing, rhonchi or rales. Chest:      Chest wall: No tenderness. Abdominal:      General: Bowel sounds are normal. There is no distension. Palpations: Abdomen is soft. Tenderness: There is no abdominal tenderness. There is no guarding or rebound. Musculoskeletal:         General: No tenderness. Normal range of motion. Cervical back: Normal range of motion. Right lower leg: No edema. Left lower leg: No edema. Lymphadenopathy:      Cervical: No cervical adenopathy. Skin:     General: Skin is warm. Findings: No erythema. Neurological:      Mental Status: She is alert and oriented to person, place, and time. Cranial Nerves: No cranial nerve deficit. Coordination: Coordination normal.   Psychiatric:         Behavior: Behavior normal.         Thought Content: Thought content normal.         Judgment: Judgment normal.         MDM    Procedures      ICD-10-CM ICD-9-CM    1. Tachycardia  R00.0 785.0 EKG, 12 LEAD, INITIAL    resting HR 92 in clinic    2.  Palpitation  R00.2 785.1     single episode this morning with HR up to 170 -200 , lasted 50 min- reolved now- ? SVT     Take good rest- decrease caffeine and exercise  Follow with cardiologist for further work up  If develop new episode- may try valsalva and gentel carotid massage, when lying down. If sxs persist with dizziness/ chest pain go  ED  Also need to see PCP for physical and lab test.    No orders of the defined types were placed in this encounter. No results found for any visits on 11/23/21. The patients condition was discussed with the patient and they understand. The patient is to follow up with primary care doctor. If signs and symptoms become worse the pt is to go to the ER. The patient is to take medications as prescribed.

## 2021-12-02 ENCOUNTER — TELEPHONE (OUTPATIENT)
Dept: INTERNAL MEDICINE CLINIC | Age: 36
End: 2021-12-02

## 2021-12-02 ENCOUNTER — OFFICE VISIT (OUTPATIENT)
Dept: INTERNAL MEDICINE CLINIC | Age: 36
End: 2021-12-02
Payer: COMMERCIAL

## 2021-12-02 VITALS
TEMPERATURE: 97.1 F | OXYGEN SATURATION: 100 % | SYSTOLIC BLOOD PRESSURE: 124 MMHG | DIASTOLIC BLOOD PRESSURE: 75 MMHG | HEIGHT: 68 IN | WEIGHT: 195 LBS | HEART RATE: 70 BPM | BODY MASS INDEX: 29.55 KG/M2 | RESPIRATION RATE: 16 BRPM

## 2021-12-02 DIAGNOSIS — Z00.00 ROUTINE MEDICAL EXAM: ICD-10-CM

## 2021-12-02 DIAGNOSIS — Z23 NEEDS FLU SHOT: ICD-10-CM

## 2021-12-02 DIAGNOSIS — R00.2 PALPITATIONS: ICD-10-CM

## 2021-12-02 DIAGNOSIS — Z11.59 NEED FOR HEPATITIS C SCREENING TEST: ICD-10-CM

## 2021-12-02 DIAGNOSIS — R00.2 PALPITATIONS: Primary | ICD-10-CM

## 2021-12-02 DIAGNOSIS — Z00.00 ROUTINE MEDICAL EXAM: Primary | ICD-10-CM

## 2021-12-02 LAB
25(OH)D3 SERPL-MCNC: 14.1 NG/ML (ref 30–100)
ALBUMIN SERPL-MCNC: 4.3 G/DL (ref 3.5–5)
ALBUMIN/GLOB SERPL: 1.3 {RATIO} (ref 1.1–2.2)
ALP SERPL-CCNC: 57 U/L (ref 45–117)
ALT SERPL-CCNC: 22 U/L (ref 12–78)
ANION GAP SERPL CALC-SCNC: 5 MMOL/L (ref 5–15)
AST SERPL-CCNC: 14 U/L (ref 15–37)
BILIRUB SERPL-MCNC: 0.5 MG/DL (ref 0.2–1)
BUN SERPL-MCNC: 16 MG/DL (ref 6–20)
BUN/CREAT SERPL: 21 (ref 12–20)
CALCIUM SERPL-MCNC: 9.2 MG/DL (ref 8.5–10.1)
CHLORIDE SERPL-SCNC: 107 MMOL/L (ref 97–108)
CHOLEST SERPL-MCNC: 240 MG/DL
CO2 SERPL-SCNC: 28 MMOL/L (ref 21–32)
CREAT SERPL-MCNC: 0.77 MG/DL (ref 0.55–1.02)
ERYTHROCYTE [DISTWIDTH] IN BLOOD BY AUTOMATED COUNT: 13 % (ref 11.5–14.5)
EST. AVERAGE GLUCOSE BLD GHB EST-MCNC: 105 MG/DL
GLOBULIN SER CALC-MCNC: 3.3 G/DL (ref 2–4)
GLUCOSE SERPL-MCNC: 96 MG/DL (ref 65–100)
HBA1C MFR BLD: 5.3 % (ref 4–5.6)
HCT VFR BLD AUTO: 43 % (ref 35–47)
HDLC SERPL-MCNC: 79 MG/DL
HDLC SERPL: 3 {RATIO} (ref 0–5)
HGB BLD-MCNC: 13.7 G/DL (ref 11.5–16)
LDLC SERPL CALC-MCNC: 133.2 MG/DL (ref 0–100)
MAGNESIUM SERPL-MCNC: 2.3 MG/DL (ref 1.6–2.4)
MCH RBC QN AUTO: 29.5 PG (ref 26–34)
MCHC RBC AUTO-ENTMCNC: 31.9 G/DL (ref 30–36.5)
MCV RBC AUTO: 92.7 FL (ref 80–99)
NRBC # BLD: 0 K/UL (ref 0–0.01)
NRBC BLD-RTO: 0 PER 100 WBC
PLATELET # BLD AUTO: 225 K/UL (ref 150–400)
PMV BLD AUTO: 10.4 FL (ref 8.9–12.9)
POTASSIUM SERPL-SCNC: 4.6 MMOL/L (ref 3.5–5.1)
PROT SERPL-MCNC: 7.6 G/DL (ref 6.4–8.2)
RBC # BLD AUTO: 4.64 M/UL (ref 3.8–5.2)
SODIUM SERPL-SCNC: 140 MMOL/L (ref 136–145)
TRIGL SERPL-MCNC: 139 MG/DL (ref ?–150)
TSH SERPL DL<=0.05 MIU/L-ACNC: 0.79 UIU/ML (ref 0.36–3.74)
VLDLC SERPL CALC-MCNC: 27.8 MG/DL
WBC # BLD AUTO: 4.4 K/UL (ref 3.6–11)

## 2021-12-02 PROCEDURE — 90686 IIV4 VACC NO PRSV 0.5 ML IM: CPT

## 2021-12-02 PROCEDURE — 90471 IMMUNIZATION ADMIN: CPT

## 2021-12-02 PROCEDURE — 99214 OFFICE O/P EST MOD 30 MIN: CPT

## 2021-12-02 NOTE — PROGRESS NOTES
Sarah Wilkinson is a 29 y.o. female patient of Dr Deloris Ho who presents concern of palpitations. Reports rapid HR. Onset 11/23. She reports was sitting at work talking to coworkers. Reports was not anxious. Felt HR elevated. Wears apple watch. -205. [facility] at [facility].   BP was normal.  Felt a bit lightheaded, no chest pain, felt like taking deeper breaths. Referred to urgent care. EKG NSR 92. Still has caffeine one cup of coffee. Was on 2 cups initially. No medications. No supplements or vitamins. History reviewed. No pertinent past medical history. Family History   Problem Relation Age of Onset    Hypertension Father     Asthma Father        Social History     Socioeconomic History    Marital status:      Spouse name: Not on file    Number of children: Not on file    Years of education: Not on file    Highest education level: Not on file   Occupational History    Not on file   Tobacco Use    Smoking status: Never Smoker    Smokeless tobacco: Never Used   Substance and Sexual Activity    Alcohol use: No    Drug use: Not on file     Comment: CBD oil    Sexual activity: Yes     Partners: Male     Birth control/protection: I.U.D. Other Topics Concern    Not on file   Social History Narrative    Not on file     Social Determinants of Health     Financial Resource Strain:     Difficulty of Paying Living Expenses: Not on file   Food Insecurity:     Worried About Running Out of Food in the Last Year: Not on file    [employer] of Food in the Last Year: Not on file   Transportation Needs:     Lack of Transportation (Medical): Not on file    Lack of Transportation (Non-Medical):  Not on file   Physical Activity:     Days of Exercise per Week: Not on file    Minutes of Exercise per Session: Not on file   Stress:     Feeling of Stress : Not on file   Social Connections:     Frequency of Communication with Friends and Family: Not on file    Frequency of Social Gatherings with Friends and Family: Not on file    Attends Mandaeism Services: Not on file    Active Member of Clubs or Organizations: Not on file    Attends Club or Organization Meetings: Not on file    Marital Status: Not on file   Intimate Partner Violence:     Fear of Current or Ex-Partner: Not on file    Emotionally Abused: Not on file    Physically Abused: Not on file    Sexually Abused: Not on file   Housing Stability:     Unable to Pay for Housing in the Last Year: Not on file    Number of [place] in the Last Year: Not on file    Unstable Housing in the Last Year: Not on file       Current Outpatient Medications on File Prior to Visit   Medication Sig Dispense Refill    levonorgestreL (Mirena) 20 mcg/24 hours (7 yrs) 52 mg IUD 1 Device by IntraUTERine route once.  SUMAtriptan (IMITREX) 50 mg tablet Take one at onset of headache,  Repeat at 2 hours if needed, max 2 in 24 hours if needed. 9 Tab 2    OTHER CBD  Oil use daily      norethindrone-ethin. estradiol (NORETHIN-ETH ESTRAD BIPHASIC PO) Take  by mouth. (Patient not taking: Reported on 11/23/2021)       No current facility-administered medications on file prior to visit. Review of Systems  Pertinent items are noted in HPI. Objective:     Visit Vitals  /75 (BP 1 Location: Left arm, BP Patient Position: Sitting, BP Cuff Size: Adult)   Pulse 70   Temp 97.1 °F (36.2 °C) (Temporal)   Resp 16   Ht 5' 8\" (1.727 m)   Wt 195 lb (88.5 kg)   LMP 11/16/2021 Comment: irregular spotting periods   SpO2 100%   BMI 29.65 kg/m²     Gen: well appearing female  HEENT:   PERRL,normal conjunctiva. External ear and canals normal, TMs no opacification or erythema,  OP no erythema, no exudates, MMM  Neck:  Supple. Thyroid normal size, nontender, without nodules. No masses or LAD  Resp:  No wheezing, no rhonchi, no rales. CV:  RRR, normal S1S2, no murmur. GI: soft, nontender, without masses. No hepatosplenomegaly.   Extrem:  +2 pulses, no edema, warm distally      Assessment/Plan:       ICD-10-CM ICD-9-CM    1. Palpitations  R00.2 785.1 ECHO ADULT COMPLETE      CARDIAC HOLTER MONITOR      MAGNESIUM      TSH 3RD GENERATION      CBC W/O DIFF      METABOLIC PANEL, COMPREHENSIVE   2. Needs flu shot  Z23 V04.81 INFLUENZA VIRUS VAC QUAD,SPLIT,PRESV FREE SYRINGE IM   3. Routine medical exam  Z00.00 V70.0 MAGNESIUM      VITAMIN D, 25 HYDROXY      HEMOGLOBIN A1C WITH EAG      HCV AB W/RFLX TO VERENICE      LIPID PANEL      TSH 3RD GENERATION      CBC W/O DIFF      METABOLIC PANEL, COMPREHENSIVE   4. Need for hepatitis C screening test  Z11.59 V73.89 HCV AB W/RFLX TO VERENICE       Follow-up and Dispositions    · Return for follow up pending tests.          Stepan Pacheco MD

## 2021-12-03 LAB
HCV AB S/CO SERPL IA: <0.1 S/CO RATIO (ref 0–0.9)
HCV AB SERPL QL IA: NORMAL

## 2021-12-04 ENCOUNTER — PATIENT MESSAGE (OUTPATIENT)
Dept: INTERNAL MEDICINE CLINIC | Age: 36
End: 2021-12-04

## 2022-08-04 RX ORDER — AZITHROMYCIN 250 MG/1
250 TABLET, FILM COATED ORAL SEE ADMIN INSTRUCTIONS
Qty: 6 TABLET | Refills: 0 | Status: SHIPPED | OUTPATIENT
Start: 2022-08-04 | End: 2022-08-09

## 2023-03-22 ENCOUNTER — VIRTUAL VISIT (OUTPATIENT)
Dept: INTERNAL MEDICINE CLINIC | Age: 38
End: 2023-03-22
Payer: COMMERCIAL

## 2023-03-22 DIAGNOSIS — T78.40XA ALLERGY, INITIAL ENCOUNTER: Primary | ICD-10-CM

## 2023-03-22 PROCEDURE — 99213 OFFICE O/P EST LOW 20 MIN: CPT | Performed by: STUDENT IN AN ORGANIZED HEALTH CARE EDUCATION/TRAINING PROGRAM

## 2023-03-22 NOTE — PROGRESS NOTES
[facility]   Internal Medicine  [address] [facility] [address], [address] [facility]  [address]  [address]  [phone]      Primary Care Visit Note    Assessment/Plan:     Diagnoses and all orders for this visit:    1. Allergy, initial encounter  - discussed importance of avoiding allergen, will provide work note if needed. - zyrtec and benadryl TRINA Carr MD    CC:     Chief Complaint   Patient presents with    Eye Swelling     Redness and dryness on and under eyes. Degrees of severity vary. HPI:     Mick Loyola is a 40 y.o. female who presents for evaluation of bilateral swollen puffy eyes for the last couple months. 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 Since she started working there she has has worsening puffy, red, dry eyelids. Eyes are relatively spared. She does not have any other rashes. She has not had any new facial products. She took zyrtect for the first time today. ROS:   All 10 point review of systems negative except those mentioned in the HPI. Past Medical History: Active Ambulatory Problems     Diagnosis Date Noted    Migraine 03/12/2015     Resolved Ambulatory Problems     Diagnosis Date Noted    No Resolved Ambulatory Problems     No Additional Past Medical History          Current Medications:     Current Outpatient Medications:     levonorgestreL (Mirena) 20 mcg/24 hours (7 yrs) 52 mg IUD, 1 Device by IntraUTERine route once., Disp: , Rfl:     OTHER, CBD  Oil use daily, Disp: , Rfl:     SUMAtriptan (IMITREX) 50 mg tablet, Take one at onset of headache,  Repeat at 2 hours if needed, max 2 in 24 hours if needed. (Patient not taking: Reported on 3/22/2023), Disp: 9 Tab, Rfl: 2    norethindrone-ethin. estradiol (NORETHIN-ETH ESTRAD BIPHASIC PO), Take  by mouth.  (Patient not taking: Reported on 11/23/2021), Disp: , Rfl:       Past Surgical History:     Past Surgical History:   Procedure Laterality Date    HX  SECTION  2017         Family History:     Family History   Problem Relation Age of Onset    Hypertension Father     Asthma Father          Social History:     Social History     Socioeconomic History    Marital status:      Spouse name: Not on file    Number of children: Not on file    Years of education: Not on file    Highest education level: Not on file   Occupational History    Not on file   Tobacco Use    Smoking status: Never    Smokeless tobacco: Never   Substance and Sexual Activity    Alcohol use: No    Drug use: Not on file     Comment: CBD oil    Sexual activity: Yes     Partners: Male     Birth control/protection: I.U.D. Other Topics Concern    Not on file   Social History Narrative    Not on file     Social Determinants of Health     Financial Resource Strain: Not on file   Food Insecurity: Not on file   Transportation Needs: Not on file   Physical Activity: Not on file   Stress: Not on file   Social Connections: Not on file   Intimate Partner Violence: Not on file   Housing Stability: Not on file            There were no vitals taken for this visit. Physical Exam:   General - Well appearing  HEENT - eomi, no conjunctivitis, red puffy eyelids  Neck - normal appearing neck  Pulm - speaking in full sentences, no respiratory distress  Neuro-  Alert and oriented, No obvious focal deficits  Psych - normal mood and affect      Labs/Imaging:     Labs and imaging reviewed by me and significant for:        Please note that this dictation was completed in part with [employer], the computer voice recognition software. Quite often unanticipated grammatical, syntax, homophones, and other interpretive errors are inadvertently transcribed by the computer software. Please disregard these errors. Please excuse any errors that have escaped final proofreading.

## 2023-03-22 NOTE — PROGRESS NOTES
Rm    Chief Complaint   Patient presents with    Eye Swelling     Redness and dryness on and under eyes. Degrees of severity vary. There were no vitals taken for this visit. 1. Have you been to the ER, urgent care clinic since your last visit? Hospitalized since your last visit? No    2. Have you seen or consulted any other health care providers outside of the [address] [place] [address] since your last visit? Include any pap smears or colon screening. No     Health Maintenance Due   Topic Date Due    Varicella Vaccine (1 of 2 - 2-dose childhood series) Never done    DTaP/Tdap/Td series (1 - Tdap) Never done    Cervical cancer screen  Never done    COVID-19 Vaccine (3 - Booster for Moderna series) 05/28/2021    Flu Vaccine (1) 08/01/2022    Depression Screen  12/02/2022        3 most recent PHQ Screens 3/22/2023   Little interest or pleasure in doing things Not at all   Feeling down, depressed, irritable, or hopeless Not at all   Total Score PHQ 2 0        Fall Risk Assessment, last 12 mths 12/2/2021   Able to walk? Yes   Fall in past 12 months? 0   Do you feel unsteady?  0   Are you worried about falling 0       Learning Assessment 5/2/2019   PRIMARY LEARNER Patient   HIGHEST LEVEL OF EDUCATION - PRIMARY LEARNER  4 YEARS OF COLLEGE   BARRIERS PRIMARY LEARNER NONE   CO-LEARNER CAREGIVER No   PRIMARY LANGUAGE ENGLISH   LEARNER PREFERENCE PRIMARY READING   ANSWERED BY Aby Wilson   RELATIONSHIP SELF

## 2023-12-29 LAB — HBA1C MFR BLD HPLC: 5.8 %

## 2024-01-02 ENCOUNTER — TELEPHONE (OUTPATIENT)
Age: 39
End: 2024-01-02

## 2024-01-02 NOTE — TELEPHONE ENCOUNTER
Patient states she needs a call back to find out if Labs/Blood work done recently, 12/28/23 at [facility] has been received by Dr. Nicolas. Please call. Thank you

## 2024-01-02 NOTE — TELEPHONE ENCOUNTER
Spoke with patient. States she had blood work done at [facility] and concerned about her A1C - 5.8 as reported.   Spoke with medical records and requested recent lab results.     Advise patient will need to schedule an appt with PCP to discuss recent results. Patient prefers [facility] as it is hard for her to get into the office.     Please advise.

## 2024-01-02 NOTE — TELEPHONE ENCOUNTER
Spoke with patient. Advised. Verbalized understanding. Advised will send message to PSR to schedule next appt    Per Dr. Nicolas  Tell pt A1c is c/w mild glucose elevation--prediabetes. I would recommend just diet, exercise and weight reduction and see me in 4-6 months for repeat labs     Can we get patient scheduled for visit in4-6 months per Dr. Nicolas please.

## 2024-06-04 ASSESSMENT — ENCOUNTER SYMPTOMS
ALLERGIC/IMMUNOLOGIC NEGATIVE: 1
EYES NEGATIVE: 1
RESPIRATORY NEGATIVE: 1
GASTROINTESTINAL NEGATIVE: 1

## 2024-06-05 ENCOUNTER — OFFICE VISIT (OUTPATIENT)
Age: 39
End: 2024-06-05
Payer: COMMERCIAL

## 2024-06-05 VITALS
TEMPERATURE: 97 F | HEIGHT: 68 IN | WEIGHT: 203.8 LBS | BODY MASS INDEX: 30.89 KG/M2 | SYSTOLIC BLOOD PRESSURE: 122 MMHG | OXYGEN SATURATION: 98 % | RESPIRATION RATE: 16 BRPM | HEART RATE: 59 BPM | DIASTOLIC BLOOD PRESSURE: 80 MMHG

## 2024-06-05 DIAGNOSIS — R73.03 PREDIABETES: ICD-10-CM

## 2024-06-05 DIAGNOSIS — R79.89 LOW VITAMIN D LEVEL: ICD-10-CM

## 2024-06-05 DIAGNOSIS — E66.9 OBESITY (BMI 30.0-34.9): ICD-10-CM

## 2024-06-05 DIAGNOSIS — E78.5 HYPERLIPIDEMIA, UNSPECIFIED HYPERLIPIDEMIA TYPE: ICD-10-CM

## 2024-06-05 DIAGNOSIS — Z00.00 ROUTINE PHYSICAL EXAMINATION: Primary | ICD-10-CM

## 2024-06-05 PROCEDURE — 99395 PREV VISIT EST AGE 18-39: CPT | Performed by: INTERNAL MEDICINE

## 2024-06-05 RX ORDER — TIRZEPATIDE 2.5 MG/.5ML
2.5 INJECTION, SOLUTION SUBCUTANEOUS
Qty: 2 ML | Refills: 5 | Status: SHIPPED | OUTPATIENT
Start: 2024-06-05

## 2024-06-05 RX ORDER — ACETAMINOPHEN AND CODEINE PHOSPHATE 120; 12 MG/5ML; MG/5ML
1 SOLUTION ORAL DAILY
COMMUNITY
Start: 2024-03-07

## 2024-06-05 SDOH — ECONOMIC STABILITY: HOUSING INSECURITY
IN THE LAST 12 MONTHS, WAS THERE A TIME WHEN YOU DID NOT HAVE A STEADY PLACE TO SLEEP OR SLEPT IN A SHELTER (INCLUDING NOW)?: NO

## 2024-06-05 SDOH — ECONOMIC STABILITY: FOOD INSECURITY: WITHIN THE PAST 12 MONTHS, YOU WORRIED THAT YOUR FOOD WOULD RUN OUT BEFORE YOU GOT MONEY TO BUY MORE.: NEVER TRUE

## 2024-06-05 SDOH — ECONOMIC STABILITY: FOOD INSECURITY: WITHIN THE PAST 12 MONTHS, THE FOOD YOU BOUGHT JUST DIDN'T LAST AND YOU DIDN'T HAVE MONEY TO GET MORE.: NEVER TRUE

## 2024-06-05 SDOH — ECONOMIC STABILITY: INCOME INSECURITY: HOW HARD IS IT FOR YOU TO PAY FOR THE VERY BASICS LIKE FOOD, HOUSING, MEDICAL CARE, AND HEATING?: NOT HARD AT ALL

## 2024-06-05 ASSESSMENT — PATIENT HEALTH QUESTIONNAIRE - PHQ9
SUM OF ALL RESPONSES TO PHQ QUESTIONS 1-9: 0
2. FEELING DOWN, DEPRESSED OR HOPELESS: NOT AT ALL
1. LITTLE INTEREST OR PLEASURE IN DOING THINGS: NOT AT ALL
SUM OF ALL RESPONSES TO PHQ QUESTIONS 1-9: 0
SUM OF ALL RESPONSES TO PHQ9 QUESTIONS 1 & 2: 0

## 2024-06-05 NOTE — PROGRESS NOTES
Chief Complaint   Patient presents with    Annual Exam       \"Have you been to the ER, urgent care clinic since your last visit?  Hospitalized since your last visit?\"    NO    “Have you seen or consulted any other health care providers outside of [facility] since your last visit?”    NO     “Have you had a pap smear?”    YES - Where: [facility] Nurse/CMA to request most recent records if not in the chart    No cervical cancer screening on file             Click Here for Release of Records Request     
MCV 92.7 12/02/2021 10:37 AM    MCH 29.5 12/02/2021 10:37 AM    MCHC 31.9 12/02/2021 10:37 AM    RDW 13.0 12/02/2021 10:37 AM     12/02/2021 10:37 AM    MPV 10.4 12/02/2021 10:37 AM      Lipids   Lab Results   Component Value Date/Time    CHOL 240 12/02/2021 10:37 AM    TRIG 139 12/02/2021 10:37 AM    HDL 79 12/02/2021 10:37 AM    CHOLHDLRATIO 3.0 12/02/2021 10:37 AM     Hemoglobin A1C:   Hemoglobin A1C   Date Value Ref Range Status   12/02/2021 5.3 4.0 - 5.6 % Final     Comment:     NEW METHOD PLEASE NOTE NEW REFERENCE RANGE  (NOTE)  HbA1C Interpretive Ranges  <5.7              Normal  5.7 - 6.4         Consider Prediabetes  >6.5              Consider Diabetes          Review of Systems   Constitutional: Negative.    HENT: Negative.     Eyes: Negative.    Respiratory: Negative.     Cardiovascular: Negative.    Gastrointestinal: Negative.    Endocrine: Negative.    Genitourinary: Negative.    Musculoskeletal: Negative.    Skin: Negative.    Allergic/Immunologic: Negative.    Neurological: Negative.    Hematological: Negative.    Psychiatric/Behavioral: Negative.          Physical Exam  Constitutional:       Appearance: Normal appearance.   HENT:      Head: Normocephalic.      Right Ear: Tympanic membrane normal.      Left Ear: Tympanic membrane normal.      Nose: Nose normal.   Cardiovascular:      Rate and Rhythm: Normal rate and regular rhythm.      Pulses: Normal pulses.   Pulmonary:      Effort: Pulmonary effort is normal.   Abdominal:      General: Abdomen is flat. Bowel sounds are normal.   Musculoskeletal:         General: Normal range of motion.      Cervical back: Normal range of motion.      Right lower leg: No edema.   Neurological:      General: No focal deficit present.      Mental Status: She is alert.   Psychiatric:         Mood and Affect: Mood normal.         Behavior: Behavior normal.         Thought Content: Thought content normal.         Judgment: Judgment normal.          ASSESSMENT and

## 2024-06-06 LAB
25(OH)D3 SERPL-MCNC: 25.2 NG/ML (ref 30–100)
ALBUMIN SERPL-MCNC: 4.2 G/DL (ref 3.5–5)
ALBUMIN/GLOB SERPL: 1.2 (ref 1.1–2.2)
ALP SERPL-CCNC: 66 U/L (ref 45–117)
ALT SERPL-CCNC: 24 U/L (ref 12–78)
ANION GAP SERPL CALC-SCNC: 4 MMOL/L (ref 5–15)
AST SERPL-CCNC: 19 U/L (ref 15–37)
BILIRUB SERPL-MCNC: 0.4 MG/DL (ref 0.2–1)
BUN SERPL-MCNC: 17 MG/DL (ref 6–20)
BUN/CREAT SERPL: 17 (ref 12–20)
CALCIUM SERPL-MCNC: 9.2 MG/DL (ref 8.5–10.1)
CHLORIDE SERPL-SCNC: 106 MMOL/L (ref 97–108)
CHOLEST SERPL-MCNC: 216 MG/DL
CO2 SERPL-SCNC: 28 MMOL/L (ref 21–32)
CREAT SERPL-MCNC: 0.99 MG/DL (ref 0.55–1.02)
ERYTHROCYTE [DISTWIDTH] IN BLOOD BY AUTOMATED COUNT: 12.7 % (ref 11.5–14.5)
EST. AVERAGE GLUCOSE BLD GHB EST-MCNC: 108 MG/DL
GLOBULIN SER CALC-MCNC: 3.5 G/DL (ref 2–4)
GLUCOSE SERPL-MCNC: 91 MG/DL (ref 65–100)
HBA1C MFR BLD: 5.4 % (ref 4–5.6)
HCT VFR BLD AUTO: 41.6 % (ref 35–47)
HDLC SERPL-MCNC: 73 MG/DL
HDLC SERPL: 3 (ref 0–5)
HGB BLD-MCNC: 13.1 G/DL (ref 11.5–16)
LDLC SERPL CALC-MCNC: 124.8 MG/DL (ref 0–100)
MCH RBC QN AUTO: 29.6 PG (ref 26–34)
MCHC RBC AUTO-ENTMCNC: 31.5 G/DL (ref 30–36.5)
MCV RBC AUTO: 93.9 FL (ref 80–99)
NRBC # BLD: 0 K/UL (ref 0–0.01)
NRBC BLD-RTO: 0 PER 100 WBC
PLATELET # BLD AUTO: 254 K/UL (ref 150–400)
PMV BLD AUTO: 9.5 FL (ref 8.9–12.9)
POTASSIUM SERPL-SCNC: 4.6 MMOL/L (ref 3.5–5.1)
PROT SERPL-MCNC: 7.7 G/DL (ref 6.4–8.2)
RBC # BLD AUTO: 4.43 M/UL (ref 3.8–5.2)
SODIUM SERPL-SCNC: 138 MMOL/L (ref 136–145)
TRIGL SERPL-MCNC: 91 MG/DL
TSH SERPL DL<=0.05 MIU/L-ACNC: 1.25 UIU/ML (ref 0.36–3.74)
VLDLC SERPL CALC-MCNC: 18.2 MG/DL
WBC # BLD AUTO: 4.5 K/UL (ref 3.6–11)

## 2024-06-07 ENCOUNTER — PATIENT MESSAGE (OUTPATIENT)
Age: 39
End: 2024-06-07

## 2024-06-14 NOTE — TELEPHONE ENCOUNTER
laurasonia dilan (Llanes: [id number]) - [id number]  Zepbound 2.5MG/0.5ML pen-injectors  Status: PA Response - ApprovedCreated: June 13th, 2024Sent: June 13th, 2024

## 2024-08-06 ENCOUNTER — OFFICE VISIT (OUTPATIENT)
Age: 39
End: 2024-08-06
Payer: COMMERCIAL

## 2024-08-06 VITALS
OXYGEN SATURATION: 99 % | HEART RATE: 50 BPM | SYSTOLIC BLOOD PRESSURE: 108 MMHG | RESPIRATION RATE: 16 BRPM | HEIGHT: 68 IN | TEMPERATURE: 97.1 F | BODY MASS INDEX: 29.49 KG/M2 | WEIGHT: 194.6 LBS | DIASTOLIC BLOOD PRESSURE: 80 MMHG

## 2024-08-06 DIAGNOSIS — T50.905A ADVERSE EFFECT OF DRUG, INITIAL ENCOUNTER: Primary | ICD-10-CM

## 2024-08-06 DIAGNOSIS — E66.3 OVERWEIGHT (BMI 25.0-29.9): ICD-10-CM

## 2024-08-06 DIAGNOSIS — R21 RASH: Primary | ICD-10-CM

## 2024-08-06 PROCEDURE — 99214 OFFICE O/P EST MOD 30 MIN: CPT | Performed by: INTERNAL MEDICINE

## 2024-08-06 RX ORDER — SEMAGLUTIDE 0.25 MG/.5ML
0.25 INJECTION, SOLUTION SUBCUTANEOUS
Qty: 2 ML | Refills: 3 | Status: SHIPPED | OUTPATIENT
Start: 2024-08-06

## 2024-08-06 NOTE — PROGRESS NOTES
\"Have you been to the ER, urgent care clinic since your last visit?  Hospitalized since your last visit?\"    NO    “Have you seen or consulted any other health care providers outside of [facility] since your last visit?”    NO     “Have you had a pap smear?”    NO    No cervical cancer screening on file             Click Here for Release of Records Request

## 2024-08-06 NOTE — PROGRESS NOTES
HISTORY OF PRESENT ILLNESS   Blanka Marquez   is a 38 y.o.  female.    Hx elevated BMI. HLD , migraines, Low vit d    Zepbound was started last visit 6/5/24  C/o small injection site rash with swelling  for 1 week after each injection  Hx PUPPS rash during pregnancy  Denies any lip or tongue swelling or distant hives lilke swelling   Patient Active Problem List    Diagnosis Date Noted    Migraine 03/12/2015     Current Outpatient Medications   Medication Sig Dispense Refill    norethindrone (MICRONOR) 0.35 MG tablet Take 1 tablet by mouth daily      Tirzepatide-Weight Management (ZEPBOUND) 2.5 MG/0.5ML SOAJ Inject 2.5 mg into the skin every 7 days 2 mL 5     No current facility-administered medications for this visit.     No Known Allergies  Social History     Tobacco Use    Smoking status: Never    Smokeless tobacco: Never   Substance Use Topics    Alcohol use: No        BMP:   Lab Results   Component Value Date/Time     06/05/2024 02:23 PM    K 4.6 06/05/2024 02:23 PM     06/05/2024 02:23 PM    CO2 28 06/05/2024 02:23 PM    BUN 17 06/05/2024 02:23 PM    CREATININE 0.99 06/05/2024 02:23 PM    GLUCOSE 91 06/05/2024 02:23 PM    CALCIUM 9.2 06/05/2024 02:23 PM      CBC:   Lab Results   Component Value Date/Time    WBC 4.5 06/05/2024 02:23 PM    RBC 4.43 06/05/2024 02:23 PM    HGB 13.1 06/05/2024 02:23 PM    HCT 41.6 06/05/2024 02:23 PM    MCV 93.9 06/05/2024 02:23 PM    MCH 29.6 06/05/2024 02:23 PM    MCHC 31.5 06/05/2024 02:23 PM    RDW 12.7 06/05/2024 02:23 PM     06/05/2024 02:23 PM    MPV 9.5 06/05/2024 02:23 PM      Lipids   Lab Results   Component Value Date/Time    CHOL 216 06/05/2024 02:23 PM    TRIG 91 06/05/2024 02:23 PM    HDL 73 06/05/2024 02:23 PM    CHOLHDLRATIO 3.0 06/05/2024 02:23 PM     Hemoglobin A1C:   Hemoglobin A1C   Date Value Ref Range Status   06/05/2024 5.4 4.0 - 5.6 % Final     Comment:     (NOTE)  HbA1C Interpretive Ranges  <5.7              Normal  5.7 - 6.4

## 2024-09-23 ENCOUNTER — OFFICE VISIT (OUTPATIENT)
Age: 39
End: 2024-09-23

## 2024-09-23 VITALS
BODY MASS INDEX: 28.4 KG/M2 | SYSTOLIC BLOOD PRESSURE: 139 MMHG | RESPIRATION RATE: 18 BRPM | TEMPERATURE: 98.2 F | WEIGHT: 187.4 LBS | OXYGEN SATURATION: 98 % | HEART RATE: 85 BPM | DIASTOLIC BLOOD PRESSURE: 91 MMHG | HEIGHT: 68 IN

## 2024-09-23 DIAGNOSIS — R03.0 ELEVATED BLOOD PRESSURE READING: ICD-10-CM

## 2024-09-23 DIAGNOSIS — J20.8 ACUTE BACTERIAL BRONCHITIS: Primary | ICD-10-CM

## 2024-09-23 DIAGNOSIS — B96.89 ACUTE BACTERIAL BRONCHITIS: Primary | ICD-10-CM

## 2024-09-23 RX ORDER — METHYLPREDNISOLONE 4 MG
4 TABLET, DOSE PACK ORAL SEE ADMIN INSTRUCTIONS
Qty: 1 KIT | Refills: 0 | Status: SHIPPED | OUTPATIENT
Start: 2024-09-23

## 2024-09-23 RX ORDER — AZITHROMYCIN 250 MG/1
250 TABLET, FILM COATED ORAL SEE ADMIN INSTRUCTIONS
Qty: 6 TABLET | Refills: 0 | Status: SHIPPED | OUTPATIENT
Start: 2024-09-23 | End: 2024-09-28

## 2024-09-23 RX ORDER — ALBUTEROL SULFATE 90 UG/1
2 INHALANT RESPIRATORY (INHALATION) EVERY 6 HOURS PRN
Qty: 18 G | Refills: 0 | Status: SHIPPED | OUTPATIENT
Start: 2024-09-23

## 2024-09-23 ASSESSMENT — ENCOUNTER SYMPTOMS
WHEEZING: 1
GASTROINTESTINAL NEGATIVE: 1
COUGH: 1
EYES NEGATIVE: 1

## 2024-12-05 NOTE — PROGRESS NOTES
HISTORY OF PRESENT ILLNESS   Blanka Marquez   is a 39 y.o.  female.  Hx elevated BMI. HLD , migraines, Low vit d     Fu  elevated BMI mild HLD  On zepbound 2.5 mg weekly-tolerating without injection site rash now  Exercise 5 d per week  [employer] out of stock    Last   A1c 5.4  Weight     Last OV  Zepbound was started last visit 6/5/24  C/o small injection site rash with swelling  for 1 week after each injection  Hx PUPPS rash during pregnancy  Denies any lip or tongue swelling or distant hives lilke swelling   Patient Active Problem List    Diagnosis Date Noted    Migraine 03/12/2015     Current Outpatient Medications   Medication Sig Dispense Refill    methylPREDNISolone (MEDROL, VINICIUS,) 4 MG tablet Take 1 tablet by mouth See Admin Instructions Take by mouth. 1 kit 0    albuterol sulfate HFA (PROVENTIL;VENTOLIN;PROAIR) 108 (90 Base) MCG/ACT inhaler Inhale 2 puffs into the lungs every 6 hours as needed for Wheezing 18 g 0    tretinoin (RETIN-A) 0.025 % cream Apply topically nightly      Semaglutide-Weight Management (WEGOVY) 0.25 MG/0.5ML SOAJ SC injection Inject 0.25 mg into the skin every 7 days (Patient not taking: Reported on 9/23/2024) 2 mL 3    norethindrone (MICRONOR) 0.35 MG tablet Take 1 tablet by mouth daily      Tirzepatide-Weight Management (ZEPBOUND) 2.5 MG/0.5ML SOAJ Inject 2.5 mg into the skin every 7 days 2 mL 5     No current facility-administered medications for this visit.     No Known Allergies     BMP:   Lab Results   Component Value Date/Time     06/05/2024 02:23 PM    K 4.6 06/05/2024 02:23 PM     06/05/2024 02:23 PM    CO2 28 06/05/2024 02:23 PM    BUN 17 06/05/2024 02:23 PM    CREATININE 0.99 06/05/2024 02:23 PM    GLUCOSE 91 06/05/2024 02:23 PM    CALCIUM 9.2 06/05/2024 02:23 PM      CBC:   Lab Results   Component Value Date/Time    WBC 4.5 06/05/2024 02:23 PM    RBC 4.43 06/05/2024 02:23 PM    HGB 13.1 06/05/2024 02:23 PM    HCT 41.6 06/05/2024 02:23 PM    MCV 93.9

## 2024-12-06 ENCOUNTER — OFFICE VISIT (OUTPATIENT)
Age: 39
End: 2024-12-06
Payer: COMMERCIAL

## 2024-12-06 VITALS
BODY MASS INDEX: 27.98 KG/M2 | HEART RATE: 57 BPM | RESPIRATION RATE: 18 BRPM | OXYGEN SATURATION: 100 % | SYSTOLIC BLOOD PRESSURE: 108 MMHG | WEIGHT: 184.6 LBS | HEIGHT: 68 IN | DIASTOLIC BLOOD PRESSURE: 70 MMHG | TEMPERATURE: 98.1 F

## 2024-12-06 DIAGNOSIS — E66.811 OBESITY (BMI 30.0-34.9): ICD-10-CM

## 2024-12-06 DIAGNOSIS — E78.5 HYPERLIPIDEMIA, UNSPECIFIED HYPERLIPIDEMIA TYPE: Primary | ICD-10-CM

## 2024-12-06 PROCEDURE — 99213 OFFICE O/P EST LOW 20 MIN: CPT | Performed by: INTERNAL MEDICINE

## 2024-12-06 RX ORDER — TIRZEPATIDE 5 MG/.5ML
5 INJECTION, SOLUTION SUBCUTANEOUS
Qty: 2 ML | Refills: 5 | Status: SHIPPED | OUTPATIENT
Start: 2024-12-06

## 2024-12-06 RX ORDER — TIRZEPATIDE 2.5 MG/.5ML
2.5 INJECTION, SOLUTION SUBCUTANEOUS
Qty: 2 ML | Refills: 5 | Status: CANCELLED | OUTPATIENT
Start: 2024-12-06

## 2024-12-06 SDOH — ECONOMIC STABILITY: FOOD INSECURITY: WITHIN THE PAST 12 MONTHS, YOU WORRIED THAT YOUR FOOD WOULD RUN OUT BEFORE YOU GOT MONEY TO BUY MORE.: NEVER TRUE

## 2024-12-06 SDOH — ECONOMIC STABILITY: FOOD INSECURITY: WITHIN THE PAST 12 MONTHS, THE FOOD YOU BOUGHT JUST DIDN'T LAST AND YOU DIDN'T HAVE MONEY TO GET MORE.: NEVER TRUE

## 2024-12-06 SDOH — ECONOMIC STABILITY: INCOME INSECURITY: HOW HARD IS IT FOR YOU TO PAY FOR THE VERY BASICS LIKE FOOD, HOUSING, MEDICAL CARE, AND HEATING?: NOT HARD AT ALL

## 2024-12-06 ASSESSMENT — PATIENT HEALTH QUESTIONNAIRE - PHQ9
SUM OF ALL RESPONSES TO PHQ QUESTIONS 1-9: 0
1. LITTLE INTEREST OR PLEASURE IN DOING THINGS: NOT AT ALL
SUM OF ALL RESPONSES TO PHQ9 QUESTIONS 1 & 2: 0
SUM OF ALL RESPONSES TO PHQ QUESTIONS 1-9: 0
SUM OF ALL RESPONSES TO PHQ QUESTIONS 1-9: 0
2. FEELING DOWN, DEPRESSED OR HOPELESS: NOT AT ALL
SUM OF ALL RESPONSES TO PHQ QUESTIONS 1-9: 0

## 2024-12-06 NOTE — PROGRESS NOTES
Patient identified with two identification factors, Name and Date of Birth.    Chief Complaint   Patient presents with    Follow-up     6 follow-up        /70 (Site: Right Upper Arm, Position: Sitting, Cuff Size: Medium Adult)   Pulse 57   Temp 98.1 °F (36.7 °C) (Temporal)   Resp 18   Ht 1.727 m (5' 8\")   Wt 83.7 kg (184 lb 9.6 oz)   LMP 11/18/2024 (Approximate)   SpO2 100%   BMI 28.07 kg/m²       1. \"Have you been to the ER, urgent care clinic since your last visit?  Hospitalized since your last visit?\" No     2. \"Have you seen or consulted any other health care providers outside of the [facility] since your last visit?\" No

## 2024-12-15 ENCOUNTER — OFFICE VISIT (OUTPATIENT)
Age: 39
End: 2024-12-15

## 2024-12-15 VITALS
RESPIRATION RATE: 16 BRPM | TEMPERATURE: 98.3 F | OXYGEN SATURATION: 99 % | BODY MASS INDEX: 27.89 KG/M2 | DIASTOLIC BLOOD PRESSURE: 69 MMHG | SYSTOLIC BLOOD PRESSURE: 154 MMHG | WEIGHT: 184 LBS | HEART RATE: 71 BPM | HEIGHT: 68 IN

## 2024-12-15 DIAGNOSIS — R09.89 SCATTERED RHONCHI OF RIGHT LUNG: ICD-10-CM

## 2024-12-15 DIAGNOSIS — R03.0 ELEVATED BLOOD PRESSURE READING: ICD-10-CM

## 2024-12-15 DIAGNOSIS — J20.8 ACUTE VIRAL BRONCHITIS: Primary | ICD-10-CM

## 2024-12-15 RX ORDER — BENZONATATE 200 MG/1
200 CAPSULE ORAL 3 TIMES DAILY PRN
Qty: 21 CAPSULE | Refills: 0 | Status: SHIPPED | OUTPATIENT
Start: 2024-12-15 | End: 2024-12-22

## 2024-12-15 RX ORDER — PREDNISONE 20 MG/1
40 TABLET ORAL DAILY
Qty: 10 TABLET | Refills: 0 | Status: SHIPPED | OUTPATIENT
Start: 2024-12-15 | End: 2024-12-20

## 2024-12-16 DIAGNOSIS — J40 BRONCHITIS: Primary | ICD-10-CM

## 2024-12-16 RX ORDER — AZITHROMYCIN 250 MG/1
TABLET, FILM COATED ORAL
Qty: 6 TABLET | Refills: 0 | Status: SHIPPED | OUTPATIENT
Start: 2024-12-16 | End: 2024-12-26

## 2025-01-17 ENCOUNTER — PATIENT MESSAGE (OUTPATIENT)
Age: 40
End: 2025-01-17

## 2025-01-21 NOTE — TELEPHONE ENCOUNTER
SADIA PADGETT (Key: [id number])  Zepbound 5MG/0.5ML pen-injectors  status: NewCreated: January 21st, 2025

## 2025-01-22 NOTE — TELEPHONE ENCOUNTER
SADIA PADGETT (Key: [id number]) - [id number]  Zepbound 5MG/0.5ML pen-injectors  status: PA Response - ApprovedCreated: January 21st, 2025Sent: January 21st, 2025

## 2025-06-12 NOTE — PROGRESS NOTES
HISTORY OF PRESENT ILLNESS   Blanka Marquez   is a 39 y.o.  female.  FU elevated BMI. 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   1 chid 8 yo    Last OV  Fu  elevated BMI mild HLD  On zepbound 2.5 mg weekly-tolerating without injection site rash now  Exercise 5 d per week  [employer] out of stock     Last   A1c 5.4  Weight   Patient Active Problem List    Diagnosis Date Noted    Migraine 03/12/2015     Current Outpatient Medications   Medication Sig Dispense Refill    tirzepatide-weight management (ZEPBOUND) 5 MG/0.5ML SOAJ subCUTAneous auto-injector pen Inject 5 mg into the skin every 7 days 2 mL 5    albuterol sulfate HFA (PROVENTIL;VENTOLIN;PROAIR) 108 (90 Base) MCG/ACT inhaler Inhale 2 puffs into the lungs every 6 hours as needed for Wheezing 18 g 0    tretinoin (RETIN-A) 0.025 % cream Apply topically nightly      norethindrone (MICRONOR) 0.35 MG tablet Take 1 tablet by mouth daily       No current facility-administered medications for this visit.     No Known Allergies  Social History     Tobacco Use    Smoking status: Never    Smokeless tobacco: Never   Substance Use Topics    Alcohol use: Yes        BMP:   Lab Results   Component Value Date/Time     06/05/2024 02:23 PM    K 4.6 06/05/2024 02:23 PM     06/05/2024 02:23 PM    CO2 28 06/05/2024 02:23 PM    BUN 17 06/05/2024 02:23 PM    CREATININE 0.99 06/05/2024 02:23 PM    GLUCOSE 91 06/05/2024 02:23 PM    CALCIUM 9.2 06/05/2024 02:23 PM      CBC:   Lab Results   Component Value Date/Time    WBC 4.5 06/05/2024 02:23 PM    RBC 4.43 06/05/2024 02:23 PM    HGB 13.1 06/05/2024 02:23 PM    HCT 41.6 06/05/2024 02:23 PM    MCV 93.9 06/05/2024 02:23 PM    MCH 29.6

## 2025-06-13 ENCOUNTER — OFFICE VISIT (OUTPATIENT)
Age: 40
End: 2025-06-13

## 2025-06-13 VITALS
OXYGEN SATURATION: 100 % | TEMPERATURE: 98.1 F | SYSTOLIC BLOOD PRESSURE: 106 MMHG | WEIGHT: 172.2 LBS | BODY MASS INDEX: 26.1 KG/M2 | HEART RATE: 59 BPM | RESPIRATION RATE: 16 BRPM | DIASTOLIC BLOOD PRESSURE: 80 MMHG | HEIGHT: 68 IN

## 2025-06-13 DIAGNOSIS — Z00.00 ROUTINE PHYSICAL EXAMINATION: Primary | ICD-10-CM

## 2025-06-13 DIAGNOSIS — E78.5 HYPERLIPIDEMIA, UNSPECIFIED HYPERLIPIDEMIA TYPE: ICD-10-CM

## 2025-06-13 DIAGNOSIS — E66.3 OVERWEIGHT (BMI 25.0-29.9): ICD-10-CM

## 2025-06-13 DIAGNOSIS — E66.811 OBESITY (BMI 30.0-34.9): ICD-10-CM

## 2025-06-13 DIAGNOSIS — Z00.00 ROUTINE PHYSICAL EXAMINATION: ICD-10-CM

## 2025-06-13 RX ORDER — TIRZEPATIDE 5 MG/.5ML
5 INJECTION, SOLUTION SUBCUTANEOUS
Qty: 2 ML | Refills: 5 | Status: SHIPPED | OUTPATIENT
Start: 2025-06-13

## 2025-06-13 RX ORDER — ACETAMINOPHEN AND CODEINE PHOSPHATE 120; 12 MG/5ML; MG/5ML
1 SOLUTION ORAL DAILY
COMMUNITY

## 2025-06-13 SDOH — ECONOMIC STABILITY: FOOD INSECURITY: WITHIN THE PAST 12 MONTHS, THE FOOD YOU BOUGHT JUST DIDN'T LAST AND YOU DIDN'T HAVE MONEY TO GET MORE.: NEVER TRUE

## 2025-06-13 SDOH — ECONOMIC STABILITY: FOOD INSECURITY: WITHIN THE PAST 12 MONTHS, YOU WORRIED THAT YOUR FOOD WOULD RUN OUT BEFORE YOU GOT MONEY TO BUY MORE.: NEVER TRUE

## 2025-06-13 ASSESSMENT — PATIENT HEALTH QUESTIONNAIRE - PHQ9
2. FEELING DOWN, DEPRESSED OR HOPELESS: NOT AT ALL
SUM OF ALL RESPONSES TO PHQ QUESTIONS 1-9: 0
1. LITTLE INTEREST OR PLEASURE IN DOING THINGS: NOT AT ALL

## 2025-06-13 NOTE — PROGRESS NOTES
Have you been to the ER, urgent care clinic since your last visit?  Hospitalized since your last visit?   NO    Have you seen or consulted any other health care providers outside our system since your last visit?   NO     “Have you had a pap smear?”    January 2025    No cervical cancer screening on file

## 2025-06-14 LAB
25(OH)D3 SERPL-MCNC: 36.1 NG/ML (ref 30–100)
ALBUMIN SERPL-MCNC: 4.4 G/DL (ref 3.5–5)
ALBUMIN/GLOB SERPL: 1.3 (ref 1.1–2.2)
ALP SERPL-CCNC: 66 U/L (ref 45–117)
ALT SERPL-CCNC: 34 U/L (ref 12–78)
ANION GAP SERPL CALC-SCNC: 2 MMOL/L (ref 2–12)
AST SERPL-CCNC: 24 U/L (ref 15–37)
BILIRUB SERPL-MCNC: 0.6 MG/DL (ref 0.2–1)
BUN SERPL-MCNC: 18 MG/DL (ref 6–20)
BUN/CREAT SERPL: 20 (ref 12–20)
CALCIUM SERPL-MCNC: 9.4 MG/DL (ref 8.5–10.1)
CHLORIDE SERPL-SCNC: 107 MMOL/L (ref 97–108)
CHOLEST SERPL-MCNC: 195 MG/DL
CO2 SERPL-SCNC: 30 MMOL/L (ref 21–32)
CREAT SERPL-MCNC: 0.89 MG/DL (ref 0.55–1.02)
ERYTHROCYTE [DISTWIDTH] IN BLOOD BY AUTOMATED COUNT: 13 % (ref 11.5–14.5)
EST. AVERAGE GLUCOSE BLD GHB EST-MCNC: 105 MG/DL
GLOBULIN SER CALC-MCNC: 3.3 G/DL (ref 2–4)
GLUCOSE SERPL-MCNC: 86 MG/DL (ref 65–100)
HBA1C MFR BLD: 5.3 % (ref 4–5.6)
HCT VFR BLD AUTO: 42.2 % (ref 35–47)
HDLC SERPL-MCNC: 77 MG/DL
HDLC SERPL: 2.5 (ref 0–5)
HGB BLD-MCNC: 13.6 G/DL (ref 11.5–16)
LDLC SERPL CALC-MCNC: 106 MG/DL (ref 0–100)
MCH RBC QN AUTO: 29.1 PG (ref 26–34)
MCHC RBC AUTO-ENTMCNC: 32.2 G/DL (ref 30–36.5)
MCV RBC AUTO: 90.4 FL (ref 80–99)
NRBC # BLD: 0 K/UL (ref 0–0.01)
NRBC BLD-RTO: 0 PER 100 WBC
PLATELET # BLD AUTO: 238 K/UL (ref 150–400)
PMV BLD AUTO: 10.4 FL (ref 8.9–12.9)
POTASSIUM SERPL-SCNC: 4.5 MMOL/L (ref 3.5–5.1)
PROT SERPL-MCNC: 7.7 G/DL (ref 6.4–8.2)
RBC # BLD AUTO: 4.67 M/UL (ref 3.8–5.2)
SODIUM SERPL-SCNC: 139 MMOL/L (ref 136–145)
TRIGL SERPL-MCNC: 60 MG/DL
TSH SERPL DL<=0.05 MIU/L-ACNC: 0.71 UIU/ML (ref 0.36–3.74)
VLDLC SERPL CALC-MCNC: 12 MG/DL
WBC # BLD AUTO: 3.9 K/UL (ref 3.6–11)

## 2025-06-15 ENCOUNTER — RESULTS FOLLOW-UP (OUTPATIENT)
Age: 40
End: 2025-06-15